# Patient Record
Sex: MALE | Race: BLACK OR AFRICAN AMERICAN | NOT HISPANIC OR LATINO | Employment: FULL TIME | URBAN - METROPOLITAN AREA
[De-identification: names, ages, dates, MRNs, and addresses within clinical notes are randomized per-mention and may not be internally consistent; named-entity substitution may affect disease eponyms.]

---

## 2017-04-26 ENCOUNTER — ALLSCRIPTS OFFICE VISIT (OUTPATIENT)
Dept: OTHER | Facility: OTHER | Age: 27
End: 2017-04-26

## 2017-04-26 DIAGNOSIS — M25.512 PAIN IN LEFT SHOULDER: ICD-10-CM

## 2017-10-09 ENCOUNTER — ALLSCRIPTS OFFICE VISIT (OUTPATIENT)
Dept: OTHER | Facility: OTHER | Age: 27
End: 2017-10-09

## 2017-10-09 DIAGNOSIS — M25.512 PAIN IN LEFT SHOULDER: ICD-10-CM

## 2017-10-10 NOTE — PROGRESS NOTES
Assessment  1  Pain in joint of left shoulder (719 41) (M25 512)    Plan  Pain in joint of left shoulder    · * XR SHOULDER 2+ VIEW LEFT; Status:Active; Requested [id number];    · 1 - Terell VILLASENOR, Darcy Gonzalez  (Orthopedic Surgery) Co-Management  *33 yo male, very active  at the gym, with ongoing left posterior shoulder pain  +_Crepitus on exam   Thanks  Caden Holder, DO  Status: Active  Requested for: [id number]  Care Summary provided  : Yes   · Follow-up PRN Evaluation and Treatment  Follow-up  Status: Complete  Done:  [id number]    Discussion/Summary  Discussion Summary:   Virginia Ruboi is stable on exam  He is to f/u PRN  left shoulder pain / strain - etiology of crepitus not clear; RTC appears intact  discussed modification of his exercise  again ordered, and he was referred to Ortho  Counseling Documentation With Imm: The patient was counseled regarding instructions for management,-risk factor reductions,-impressions,-importance of compliance with treatment  Medication SE Review and Pt Understands Tx: The treatment plan was reviewed with the patient/guardian  The patient/guardian understands and agrees with the treatment plan      Chief Complaint  Chief Complaint Free Text Note Form: Left shoulder pain pain for a couple months pain 2/10  had gotten better, taking it lighter at the gym - never got xrays done  more in the posterior shoulder now  History of Present Illness  HPI: As above  Review of Systems  Complete-Male:   Constitutional: as noted in HPI  Musculoskeletal: as noted in HPI  Active Problems  1  Acute pain of left shoulder (719 41) (M25 512)   2  Pain in joint of left shoulder (719 41) (M25 512)    Past Medical History  Active Problems And Past Medical History Reviewed: The active problems and past medical history were reviewed and updated today  Surgical History  1  History of Hernia Repair   2  History of Oral Surgery Tooth Extraction [place] Tooth  Surgical History Reviewed:    The surgical history was reviewed and updated today  Family History  Mother    1  No pertinent family history  Father    2  No pertinent family history    Social History   · Never a smoker   · Social alcohol use (Z78 9)    Current Meds   1  No Reported Medications Recorded    Allergies  1  MMR    Vitals  Vital Signs    Recorded: 94HGO7551 11:20AM   Heart Rate 60   Respiration 12   Systolic 987   Diastolic 84   Weight 414 lb 6 oz   BMI Calculated 24 39   BSA Calculated 2 01     Physical Exam    Constitutional   General appearance: No acute distress, well appearing and well nourished  -NAD; VSS; pleasant  Musculoskeletal   Gait and station: Normal     Inspection/palpation of joints, bones, and muscles: Normal  -Overall good ROm of the left shoulder; RTC appears intact on exam  +Crepitus noted with ROM exercises     Psychiatric   Orientation to person, place and time: Normal     Mood and affect: Normal          Signatures   Electronically signed by : Jamie Pitts DO; Oct  9 2017 11:42AM EST                       (Author)

## 2017-11-19 ENCOUNTER — APPOINTMENT (OUTPATIENT)
Dept: RADIOLOGY | Age: 27
End: 2017-11-19
Payer: COMMERCIAL

## 2017-11-19 ENCOUNTER — TRANSCRIBE ORDERS (OUTPATIENT)
Dept: ADMINISTRATIVE | Age: 27
End: 2017-11-19

## 2017-11-19 DIAGNOSIS — M25.512 PAIN IN LEFT SHOULDER: ICD-10-CM

## 2017-11-19 PROCEDURE — 73030 X-RAY EXAM OF SHOULDER: CPT

## 2017-11-24 ENCOUNTER — GENERIC CONVERSION - ENCOUNTER (OUTPATIENT)
Dept: OTHER | Facility: OTHER | Age: 27
End: 2017-11-24

## 2017-12-20 ENCOUNTER — GENERIC CONVERSION - ENCOUNTER (OUTPATIENT)
Dept: OTHER | Facility: OTHER | Age: 27
End: 2017-12-20

## 2017-12-20 DIAGNOSIS — M25.512 PAIN IN LEFT SHOULDER: ICD-10-CM

## 2017-12-20 DIAGNOSIS — M79.641 PAIN OF RIGHT HAND: ICD-10-CM

## 2018-01-02 ENCOUNTER — APPOINTMENT (OUTPATIENT)
Dept: PHYSICAL THERAPY | Facility: OTHER | Age: 28
End: 2018-01-02
Payer: COMMERCIAL

## 2018-01-02 DIAGNOSIS — M25.512 PAIN IN LEFT SHOULDER: ICD-10-CM

## 2018-01-02 PROCEDURE — 97161 PT EVAL LOW COMPLEX 20 MIN: CPT

## 2018-01-02 PROCEDURE — G8985 CARRY GOAL STATUS: HCPCS

## 2018-01-02 PROCEDURE — G8984 CARRY CURRENT STATUS: HCPCS

## 2018-01-04 ENCOUNTER — APPOINTMENT (OUTPATIENT)
Dept: PHYSICAL THERAPY | Facility: OTHER | Age: 28
End: 2018-01-04
Payer: COMMERCIAL

## 2018-01-04 PROCEDURE — 97112 NEUROMUSCULAR REEDUCATION: CPT

## 2018-01-04 PROCEDURE — 97140 MANUAL THERAPY 1/> REGIONS: CPT

## 2018-01-04 PROCEDURE — 97110 THERAPEUTIC EXERCISES: CPT

## 2018-01-08 ENCOUNTER — APPOINTMENT (OUTPATIENT)
Dept: PHYSICAL THERAPY | Facility: OTHER | Age: 28
End: 2018-01-08
Payer: COMMERCIAL

## 2018-01-08 PROCEDURE — 97112 NEUROMUSCULAR REEDUCATION: CPT

## 2018-01-08 PROCEDURE — 97110 THERAPEUTIC EXERCISES: CPT

## 2018-01-08 PROCEDURE — 97140 MANUAL THERAPY 1/> REGIONS: CPT

## 2018-01-11 ENCOUNTER — APPOINTMENT (OUTPATIENT)
Dept: PHYSICAL THERAPY | Facility: OTHER | Age: 28
End: 2018-01-11
Payer: COMMERCIAL

## 2018-01-11 PROCEDURE — 97110 THERAPEUTIC EXERCISES: CPT

## 2018-01-11 PROCEDURE — 97140 MANUAL THERAPY 1/> REGIONS: CPT

## 2018-01-11 PROCEDURE — 97112 NEUROMUSCULAR REEDUCATION: CPT

## 2018-01-13 VITALS
DIASTOLIC BLOOD PRESSURE: 80 MMHG | SYSTOLIC BLOOD PRESSURE: 110 MMHG | WEIGHT: 165.13 LBS | BODY MASS INDEX: 23.12 KG/M2 | RESPIRATION RATE: 16 BRPM | HEIGHT: 71 IN | HEART RATE: 72 BPM

## 2018-01-13 NOTE — RESULT NOTES
Verified Results  * XR SHOULDER 2+ VIEW LEFT [id number] 02:50PM Warner Fuentes Order Number: [id number]   Performing Comments: 31 yo male, very active at the gym, with ongoing left posterior shoulder pain  +_Crepitus on exam   Thanks  Giorgio Junior, DO     Test Name Result Flag Reference   XR SHOULDER 2+ VW LEFT (Report)     LEFT SHOULDER     INDICATION: 78-year-old male, left shoulder pain   COMPARISON: None     VIEWS: 3     IMAGES: 3     FINDINGS:     There is no acute fracture or dislocation  No degenerative changes  No lytic or blastic lesions are seen  Soft tissues are unremarkable  IMPRESSION:     No acute osseous abnormality         Workstation performed: [id number]     Signed by:   Carrington Claudio MD   11/24/17

## 2018-01-14 VITALS
SYSTOLIC BLOOD PRESSURE: 118 MMHG | HEART RATE: 60 BPM | WEIGHT: 176.38 LBS | BODY MASS INDEX: 24.39 KG/M2 | RESPIRATION RATE: 12 BRPM | DIASTOLIC BLOOD PRESSURE: 84 MMHG

## 2018-01-15 ENCOUNTER — APPOINTMENT (OUTPATIENT)
Dept: PHYSICAL THERAPY | Facility: OTHER | Age: 28
End: 2018-01-15
Payer: COMMERCIAL

## 2018-01-15 PROCEDURE — 97140 MANUAL THERAPY 1/> REGIONS: CPT

## 2018-01-15 PROCEDURE — 97110 THERAPEUTIC EXERCISES: CPT

## 2018-01-15 PROCEDURE — 97112 NEUROMUSCULAR REEDUCATION: CPT

## 2018-01-16 ENCOUNTER — ALLSCRIPTS OFFICE VISIT (OUTPATIENT)
Dept: OTHER | Facility: OTHER | Age: 28
End: 2018-01-16

## 2018-01-17 NOTE — PROGRESS NOTES
Assessment   1  Hand pain, right (729 5) (D06 259)    Plan   Hand pain, right    · * XR HAND 3+ VIEW RIGHT; Status:Active; Requested [id number];    · Follow-up PRN Evaluation and Treatment  Follow-up  Status: Complete  Done:    [id number]    Discussion/Summary      Clark is stable on exam  He is to f/u or call PRN no improvement (at that point, I would locally refer to [facility])  more here a soft tissue injury / possibly mild nerve palsy in the right hand, with the recent event  I suspect that this will resolve with a little time  Xrays were ordered as a precaution  can take OTC NSAIDs with food PRN, can perform warm hand soaks, can use a stress ball / moisés / [employer]-Tawana to work on ROM and  strength, and is to try lifting with this hand for the next 1 - 2 weeks  The patient was counseled regarding instructions for management,-- risk factor reductions,-- impressions,-- importance of compliance with treatment  The treatment plan was reviewed with the patient/guardian  The patient/guardian understands and agrees with the treatment plan      Chief Complaint   PT is being seen today due to having numbness and puffiness in right hand between the middle and ring finger  1 week ago, he lightly punched down with the right hand to break packing tape on a box he was taping up -> the tape twisted, and pulled on the soft tissue between the 3rd and 4th digits, causing pain that shot up into his forearm  History of Present Illness   HPI: As above  Review of Systems        Constitutional: as noted in HPI  Musculoskeletal: as noted in HPI  Active Problems   1  Acute pain of left shoulder (719 41) (M25 512)   2  Pain in joint of left shoulder (719 41) (M23 512)    Past Medical History   Active Problems And Past Medical History Reviewed: The active problems and past medical history were reviewed and updated today  Family History   Mother    1   No pertinent family history  Father 2  No pertinent family history    Social History    · Never a smoker   · Social alcohol use (Z78 9)    Surgical History   1  History of Hernia Repair   2  History of Oral Surgery Tooth Extraction [place] Tooth    Current Meds    1  No Reported Medications Recorded     The medication list was reviewed and updated today  Allergies   1  MMR    Vitals    Recorded: 06FAX9326 04:55PM   Heart Rate 76   Respiration 12   Systolic 656   Diastolic 78   Height 5 ft 11 in   Weight 175 lb 6 oz   BMI Calculated 24 46   BSA Calculated 1 99     Physical Exam        Constitutional      General appearance: No acute distress, well appearing and well nourished  -- NAD; VSS; pleasant  Musculoskeletal      Gait and station: Normal        Inspection/palpation of joints, bones, and muscles: Normal  -- Right Hand: Pt with mild TTP and slight puffiness in the soft tissue between the MCP joints of the 3rd and fourth digits; no erythema, fluctuance, crepitus; normal hand  strength, and flexor / extensor tendons appear intact; cap refill in the fingers and sensation in the fingers are normal       Psychiatric      Orientation to person, place and time: Normal        Mood and affect: Normal           Future Appointments      Date/Time Provider Specialty Site   01/31/2018 10:10 AM ÁNGELA Kidd   Orthopedic Surgery [facility]     Signatures    Electronically signed by : Luis Alexander DO; Jan 16 2018  5:26PM EST                       (Author)

## 2018-01-18 ENCOUNTER — APPOINTMENT (OUTPATIENT)
Dept: PHYSICAL THERAPY | Facility: OTHER | Age: 28
End: 2018-01-18
Payer: COMMERCIAL

## 2018-01-18 PROCEDURE — 97530 THERAPEUTIC ACTIVITIES: CPT

## 2018-01-18 PROCEDURE — 97112 NEUROMUSCULAR REEDUCATION: CPT

## 2018-01-18 PROCEDURE — 97140 MANUAL THERAPY 1/> REGIONS: CPT

## 2018-01-22 ENCOUNTER — APPOINTMENT (OUTPATIENT)
Dept: PHYSICAL THERAPY | Facility: OTHER | Age: 28
End: 2018-01-22
Payer: COMMERCIAL

## 2018-01-22 PROCEDURE — 97112 NEUROMUSCULAR REEDUCATION: CPT

## 2018-01-22 PROCEDURE — 97140 MANUAL THERAPY 1/> REGIONS: CPT

## 2018-01-22 PROCEDURE — 97110 THERAPEUTIC EXERCISES: CPT

## 2018-01-23 VITALS
SYSTOLIC BLOOD PRESSURE: 112 MMHG | HEIGHT: 71 IN | BODY MASS INDEX: 24.55 KG/M2 | HEART RATE: 76 BPM | RESPIRATION RATE: 12 BRPM | WEIGHT: 175.38 LBS | DIASTOLIC BLOOD PRESSURE: 78 MMHG

## 2018-01-24 VITALS
SYSTOLIC BLOOD PRESSURE: 134 MMHG | HEART RATE: 73 BPM | HEIGHT: 71 IN | DIASTOLIC BLOOD PRESSURE: 85 MMHG | BODY MASS INDEX: 24.99 KG/M2 | WEIGHT: 178.5 LBS

## 2018-01-25 ENCOUNTER — OFFICE VISIT (OUTPATIENT)
Dept: PHYSICAL THERAPY | Facility: OTHER | Age: 28
End: 2018-01-25
Payer: COMMERCIAL

## 2018-01-25 DIAGNOSIS — M25.512 ACUTE PAIN OF LEFT SHOULDER: Primary | ICD-10-CM

## 2018-01-25 PROCEDURE — 97110 THERAPEUTIC EXERCISES: CPT | Performed by: PEDIATRICS

## 2018-01-25 PROCEDURE — 97112 NEUROMUSCULAR REEDUCATION: CPT | Performed by: PEDIATRICS

## 2018-01-25 PROCEDURE — 97140 MANUAL THERAPY 1/> REGIONS: CPT | Performed by: PEDIATRICS

## 2018-01-25 NOTE — PROGRESS NOTES
Daily Note     Today's date: 2018  Patient name: Octavio Taveras  : 1990  MRN: [id number]  Referring provider: Yandy Santos MD  Dx:   Encounter Diagnosis   Name Primary?  Acute pain of left shoulder Yes                  Subjective: Pt  Reports he had slight muscle soreness after last treatment session  States he is noticing overall improvements  Objective: See treatment diary below  Precautions: None    Daily Treatment Diary     Manual              PROM L shoulder 10'                                                                    Exercise Diary              Adan LPD 20#  3x10            Adan row 25#  3x10            [place] IR/ER 9#  3x10 ea            Adan chops 20#  3x10            Pickens rops alternating 30" x 2            Pickens ropes slam 30" x 2            TB wall box OTB  7x            UBE 5'/5            Body blade                                                                                                                                                                Modalities                                                                 Assessment: Tolerated treatment well  Patient exhibited good technqiue with therapeutic exercises Added Rope slams with good tolerance      Plan: Continue per plan of care  and Progress treatment as tolerated

## 2018-01-29 ENCOUNTER — OFFICE VISIT (OUTPATIENT)
Dept: PHYSICAL THERAPY | Facility: OTHER | Age: 28
End: 2018-01-29
Payer: COMMERCIAL

## 2018-01-29 DIAGNOSIS — M25.512 ACUTE PAIN OF LEFT SHOULDER: Primary | ICD-10-CM

## 2018-01-29 PROCEDURE — 97112 NEUROMUSCULAR REEDUCATION: CPT | Performed by: PEDIATRICS

## 2018-01-29 PROCEDURE — 97110 THERAPEUTIC EXERCISES: CPT | Performed by: PEDIATRICS

## 2018-01-29 PROCEDURE — 97140 MANUAL THERAPY 1/> REGIONS: CPT | Performed by: PEDIATRICS

## 2018-01-29 NOTE — PROGRESS NOTES
Daily Note     Today's date: 2018  Patient name: Rocio Faustin  : 1990  MRN: [id number]  Referring provider: Aníbal Parra MD  Dx:   Encounter Diagnosis   Name Primary?  Acute pain of left shoulder Yes        IEP 5:00 - 5:20  1:1 with a PTA 5:20 - 6:00          Subjective: Pt  Reports he feels the same as last visit  Pt  Reports decreased pain with some lifting activities and improved body awareness during lifting  Objective: See treatment diary below  Precautions: None     Daily Treatment Diary      Manual                     PROM L shoulder 10'  8'                                                                                                                         Exercise Diary                        Adan LPD 20#  3x10  25#  3 x 10                   Adan row 25#  3x10  35#  3 x 10                   [place] IR/ER 9#  3x10 ea  IR 12#  3 x 10  ER 10#  3 x 10                   Adan chops 20#  3x10  22#  3 x 10                   Pickens rops alternating 30" x 2  30" x 2                   Pickens ropes slam 30" x 2  30" x 2                   TB wall box OTB  7x  OTB  7x                   UBE 5'/5  5'/5'                   Body blade    30" x 2  AP/ML                                                                                                                                                                                                                                                                                                 Modalities                                                                                                        Assessment: Tolerated treatment well  Patient demonstrated fatigue post treatment and could benefit from continued PT Burning muscle fatigue noted throughout treatment session, however, no sharp localized pain noted  Good technique with all exercises  Able to increase resistance today  Also added body blade  Pt   Was challenged with this exercise but did not complain of pain  Plan: Progress treatment as tolerated

## 2018-01-31 VITALS
SYSTOLIC BLOOD PRESSURE: 118 MMHG | HEART RATE: 76 BPM | WEIGHT: 172 LBS | DIASTOLIC BLOOD PRESSURE: 73 MMHG | HEIGHT: 72 IN | BODY MASS INDEX: 23.3 KG/M2

## 2018-01-31 DIAGNOSIS — M25.512 ACUTE PAIN OF LEFT SHOULDER: Primary | ICD-10-CM

## 2018-01-31 PROCEDURE — 99213 OFFICE O/P EST LOW 20 MIN: CPT | Performed by: ORTHOPAEDIC SURGERY

## 2018-01-31 NOTE — PROGRESS NOTES
Kolby Gordillo  (89 y o  male)  : 1990  MRN: [id number]  Encounter Date: 2018    Chief Complaint   Patient presents with    Left Shoulder - Pain       Visit Diagnosis / Orders  1  Acute pain of left shoulder         Assessment / Plan  Left shoulder posterior instability and scapular dysfunction    · Continue PT and transition to HEP  · Anti-inflammatories and ice prn  · Return if symptoms worsen or fail to improve  History of Present Illness  Kolby Gordillo is a 32 y o  male who presents to F/U left shoulder posterior instability, present for about 7 months  He has done about 6 weeks of formal PT and notes near complete resolution of symptoms  He does still have some aching and occasional clicking  Denies paresthesias  Review of Systems  Pertinent items are noted in HPI  All other systems were reviewed and are negative  Physical Exam  /73   Pulse 76   Ht 6' (1 829 m)   Wt 78 kg (172 lb)   BMI 23 33 kg/m²   Cons: Appears well  No apparent distress  Psych: Alert  Oriented x3  Mood and affect normal   Eyes: PERRLA, EOMI  Resp: Normal effort  No audible wheezing or stridor  CV: Palpable pulse  No discernable arrhythmia  No LE edema  Lymph:  No palpable cervical, axillary, or inguinal lymphadenopathy  Skin: Warm  No palpable masses  No visible lesions  Neuro: Normal and symmetric DTR's  Normal muscle tone  Left Shoulder Exam  Alignment / Posture:  Mild scapular protraction  Mild scapular dyskinesis  Inspection:  No swelling  No muscle atrophy  Palpation:  No tenderness  Mild clicking with shouder rotation  ROM:  Shoulder   Shoulder ER 80  Shoulder IR T6   Strength:  Supraspinatus 5/5  Infraspinatus 5/5  Subscapularis 5/5  Stability:  (-) Jerk test   Tests: (-) Montemayor  (-) Speed  (-) Noble  Neurovascular:  Sensation intact in Ax/R/M/U nerve distributions  2+ radial pulse        Studies Reviewed  No studies to review    Procedures  None today     Medical, Surgical, Family, and Social History  The patient's medical history, family history, and social history, were reviewed and updated as appropriate  History reviewed  No pertinent past medical history  Past Surgical History:   Procedure Laterality Date    HERNIA REPAIR         Family History   Problem Relation Age of Onset    No Known Problems Mother     No Known Problems Father        Social History     Occupational History    Not on file  Social History Main Topics    Smoking status: Light Tobacco Smoker    Smokeless tobacco: Current User    Alcohol use Yes    Drug use: Unknown    Sexual activity: Not on file       No Known Allergies    No current outpatient prescriptions on file        Saul Marrero MD

## 2018-04-21 NOTE — PROGRESS NOTES
Patient was supposed to return for a RE but he did not come back  Patient responded favorably to physical therapy  At this time, he has been discharged in [facility]

## 2024-01-21 ENCOUNTER — APPOINTMENT (EMERGENCY)
Dept: RADIOLOGY | Facility: HOSPITAL | Age: 34
End: 2024-01-21
Payer: COMMERCIAL

## 2024-01-21 ENCOUNTER — HOSPITAL ENCOUNTER (EMERGENCY)
Facility: HOSPITAL | Age: 34
Discharge: HOME | End: 2024-01-21
Attending: EMERGENCY MEDICINE
Payer: COMMERCIAL

## 2024-01-21 VITALS
BODY MASS INDEX: 23.03 KG/M2 | OXYGEN SATURATION: 100 % | HEIGHT: 72 IN | SYSTOLIC BLOOD PRESSURE: 131 MMHG | TEMPERATURE: 97.9 F | HEART RATE: 85 BPM | DIASTOLIC BLOOD PRESSURE: 83 MMHG | WEIGHT: 170 LBS | RESPIRATION RATE: 18 BRPM

## 2024-01-21 DIAGNOSIS — S52.521A CLOSED TORUS FRACTURE OF DISTAL END OF RIGHT RADIUS, INITIAL ENCOUNTER: Primary | ICD-10-CM

## 2024-01-21 PROCEDURE — 73070 X-RAY EXAM OF ELBOW: CPT

## 2024-01-21 PROCEDURE — 29125 APPL SHORT ARM SPLINT STATIC: CPT | Performed by: EMERGENCY MEDICINE

## 2024-01-21 PROCEDURE — 73110 X-RAY EXAM OF WRIST: CPT

## 2024-01-21 PROCEDURE — 99283 EMERGENCY DEPT VISIT LOW MDM: CPT

## 2024-01-21 PROCEDURE — 73090 X-RAY EXAM OF FOREARM: CPT

## 2024-01-21 PROCEDURE — 99284 EMERGENCY DEPT VISIT MOD MDM: CPT | Performed by: EMERGENCY MEDICINE

## 2024-01-21 PROCEDURE — 73130 X-RAY EXAM OF HAND: CPT

## 2024-01-21 RX ORDER — IBUPROFEN 600 MG/1
600 TABLET ORAL ONCE
Status: DISCONTINUED | OUTPATIENT
Start: 2024-01-21 | End: 2024-01-21 | Stop reason: HOSPADM

## 2024-01-21 NOTE — H&P (VIEW-ONLY)
History  Chief Complaint   Patient presents with    Wrist Injury     Right wrist injury after snowboarding. He hear a pop. Splinted by facility      Mr. Conn is a 33-year-old male with a past medical history of hernia repair when he was a child who presents to the ED after snowboarding accident.  Patient reports that he was on his snowboard when he caught the edge of his snowboard wrong and went up spinning around and falling onto his wrist.  Patient reports that his wrist hurts, and possibly his elbow.  He is unsure if this is an irritation of his typical elbow tendinitis or if this is a new injury.  Reports that he has not taken any pain medication, and he was wearing a helmet.  Reports that he did not lose consciousness, vomit, and remembers the entire event.        None       History reviewed. No pertinent past medical history.    Past Surgical History:   Procedure Laterality Date    HERNIA REPAIR         Family History   Problem Relation Age of Onset    No Known Problems Mother     No Known Problems Father      I have reviewed and agree with the history as documented.    E-Cigarette/Vaping    E-Cigarette Use Current Every Day User      E-Cigarette/Vaping Substances    Nicotine Yes      Social History     Tobacco Use    Smoking status: Light Smoker    Smokeless tobacco: Current   Vaping Use    Vaping status: Every Day    Substances: Nicotine   Substance Use Topics    Alcohol use: Yes        Review of Systems   Constitutional:  Negative for chills and fever.   Eyes:  Negative for pain and visual disturbance.   Respiratory:  Negative for chest tightness and shortness of breath.    Cardiovascular:  Negative for chest pain.   Gastrointestinal:  Negative for abdominal pain, nausea and vomiting.   Genitourinary:  Negative for dysuria and hematuria.   Skin:  Negative for rash and wound.   Neurological:  Negative for dizziness, syncope, light-headedness and headaches.   Psychiatric/Behavioral: Negative.          Physical Exam  ED Triage Vitals [01/21/24 1509]   Temperature Pulse Respirations Blood Pressure SpO2   97.9 °F (36.6 °C) 85 18 131/83 100 %      Temp Source Heart Rate Source Patient Position - Orthostatic VS BP Location FiO2 (%)   Oral Monitor Lying Left arm --      Pain Score       10 - Worst Possible Pain             Orthostatic Vital Signs  Vitals:    01/21/24 1509   BP: 131/83   Pulse: 85   Patient Position - Orthostatic VS: Lying       Physical Exam  Vitals and nursing note reviewed.   Constitutional:       Appearance: Normal appearance.   HENT:      Head: Normocephalic and atraumatic.      Comments: No external signs of trauma, patient is able to move his neck in a nomal ROM without pain.     Right Ear: External ear normal.      Left Ear: External ear normal.      Nose: Nose normal.      Mouth/Throat:      Mouth: Mucous membranes are moist.      Pharynx: Oropharynx is clear.   Eyes:      Extraocular Movements: Extraocular movements intact.      Conjunctiva/sclera: Conjunctivae normal.      Pupils: Pupils are equal, round, and reactive to light.   Cardiovascular:      Rate and Rhythm: Normal rate.   Pulmonary:      Effort: Pulmonary effort is normal.   Abdominal:      General: Abdomen is flat. There is no distension.      Tenderness: There is no abdominal tenderness. There is no guarding.   Musculoskeletal:         General: Normal range of motion.      Cervical back: Normal range of motion and neck supple.      Comments: Tenderness and swelling of the R wrist/forearm. Skin is cool but patient has been applying ice pack. Sensation, movement and bloodflow intact distal to the injury.   No pain at any other joint with palpation   Skin:     General: Skin is warm and dry.   Neurological:      General: No focal deficit present.      Mental Status: He is alert and oriented to person, place, and time.   Psychiatric:         Mood and Affect: Mood normal.         Behavior: Behavior normal.         ED  Medications  Medications   ibuprofen (MOTRIN) tablet 600 mg (600 mg Oral Not Given 1/21/24 1545)       Diagnostic Studies  Results Reviewed       None                   XR wrist 3+ views RIGHT    (Results Pending)   XR forearm 2 views RIGHT    (Results Pending)   XR hand 3+ views RIGHT    (Results Pending)   XR elbow 2 vw right    (Results Pending)   XR wrist 3+ views RIGHT    (Results Pending)         Procedures  Splint application    Date/Time: 1/21/2024 7:38 PM    Performed by: Sho Matthews MD  Authorized by: Sho Matthews MD  Universal Protocol:  Consent: Verbal consent obtained.  Consent given by: patient    Pre-procedure details:     Sensation:  Normal  Procedure details:     Laterality:  Right    Location:  Arm    Arm:  R lower arm    Strapping: no      Splint type:  Sugar tong    Supplies:  Cotton padding, elastic bandage and Ortho-Glass        ED Course                             SBIRT 20yo+      Flowsheet Row Most Recent Value   Initial Alcohol Screen: US AUDIT-C     1. How often do you have a drink containing alcohol? 0 Filed at: 01/21/2024 1509   2. How many drinks containing alcohol do you have on a typical day you are drinking?  0 Filed at: 01/21/2024 1509   3a. Male UNDER 65: How often do you have five or more drinks on one occasion? 0 Filed at: 01/21/2024 1509   Audit-C Score 0 Filed at: 01/21/2024 1509   [place]: How many times in the past year have you...    Used an illegal drug or used a prescription medication for non-medical reasons? Never Filed at: 01/21/2024 1509                  Medical Decision Making  Mr. Conn is a 33-year-old male with a past medical history of hernia repair when he was a child who presents to the ED after snowboarding accident.    Based on history and physical DDx includes but is not limited to: fracture vs sprain of the R wrist/forearm    Patient reports he doesn't typically take pain medication, prescribed motrin at patient request  XR as documented on ED  read.  Splint applied. Ortho referral provided.    Results shared with patient. Return precautions given and patient expresses understanding and is comfortable with discharge.      Amount and/or Complexity of Data Reviewed  Radiology: ordered.    Risk  Prescription drug management.          Disposition  Final diagnoses:   Closed torus fracture of distal end of right radius, initial encounter     Time reflects when diagnosis was documented in both MDM as applicable and the Disposition within this note       Time User Action Codes Description Comment    1/21/2024  4:17 PM Ant Chandra Add [S52.521A] Closed torus fracture of distal end of right radius, initial encounter     1/21/2024  4:49 PM Sho Matthews Add [S52.91XA] Closed right radial fracture     1/21/2024  4:49 PM Sho Matthews Remove [S52.91XA] Closed right radial fracture           ED Disposition       ED Disposition   Discharge    Condition   Stable    Date/Time   Sun Jan 21, 2024 1617    Comment   Candido Conn discharge to home/self care.                   Follow-up Information       Follow up With Specialties Details Why Contact Info Additional Information    [facility] [place] [facility] Schedule an appointment as soon as possible for a visit   [address] [address] [phone] [facility] [place], [facility] [address], [address], [place], [place], [address] [phone]            There are no discharge medications for this patient.        PDMP Review       None             ED Provider  Attending physically available and evaluated Epidanuta LEIGHA Conn. I managed the patient along with the ED Attending.    Electronically Signed by           Sho Matthews MD  01/21/24 8980

## 2024-01-21 NOTE — ED PROVIDER NOTES
History  Chief Complaint   Patient presents with    Wrist Injury     Right wrist injury after snowboarding. He hear a pop. Splinted by facility      Mr. Conn is a 33-year-old male with a past medical history of hernia repair when he was a child who presents to the ED after snowboarding accident.  Patient reports that he was on his snowboard when he caught the edge of his snowboard wrong and went up spinning around and falling onto his wrist.  Patient reports that his wrist hurts, and possibly his elbow.  He is unsure if this is an irritation of his typical elbow tendinitis or if this is a new injury.  Reports that he has not taken any pain medication, and he was wearing a helmet.  Reports that he did not lose consciousness, vomit, and remembers the entire event.        None       History reviewed. No pertinent past medical history.    Past Surgical History:   Procedure Laterality Date    HERNIA REPAIR         Family History   Problem Relation Age of Onset    No Known Problems Mother     No Known Problems Father      I have reviewed and agree with the history as documented.    E-Cigarette/Vaping    E-Cigarette Use Current Every Day User      E-Cigarette/Vaping Substances    Nicotine Yes      Social History     Tobacco Use    Smoking status: Light Smoker    Smokeless tobacco: Current   Vaping Use    Vaping status: Every Day    Substances: Nicotine   Substance Use Topics    Alcohol use: Yes        Review of Systems   Constitutional:  Negative for chills and fever.   Eyes:  Negative for pain and visual disturbance.   Respiratory:  Negative for chest tightness and shortness of breath.    Cardiovascular:  Negative for chest pain.   Gastrointestinal:  Negative for abdominal pain, nausea and vomiting.   Genitourinary:  Negative for dysuria and hematuria.   Skin:  Negative for rash and wound.   Neurological:  Negative for dizziness, syncope, light-headedness and headaches.   Psychiatric/Behavioral: Negative.          Physical Exam  ED Triage Vitals [01/21/24 1509]   Temperature Pulse Respirations Blood Pressure SpO2   97.9 °F (36.6 °C) 85 18 131/83 100 %      Temp Source Heart Rate Source Patient Position - Orthostatic VS BP Location FiO2 (%)   Oral Monitor Lying Left arm --      Pain Score       10 - Worst Possible Pain             Orthostatic Vital Signs  Vitals:    01/21/24 1509   BP: 131/83   Pulse: 85   Patient Position - Orthostatic VS: Lying       Physical Exam  Vitals and nursing note reviewed.   Constitutional:       Appearance: Normal appearance.   HENT:      Head: Normocephalic and atraumatic.      Comments: No external signs of trauma, patient is able to move his neck in a nomal ROM without pain.     Right Ear: External ear normal.      Left Ear: External ear normal.      Nose: Nose normal.      Mouth/Throat:      Mouth: Mucous membranes are moist.      Pharynx: Oropharynx is clear.   Eyes:      Extraocular Movements: Extraocular movements intact.      Conjunctiva/sclera: Conjunctivae normal.      Pupils: Pupils are equal, round, and reactive to light.   Cardiovascular:      Rate and Rhythm: Normal rate.   Pulmonary:      Effort: Pulmonary effort is normal.   Abdominal:      General: Abdomen is flat. There is no distension.      Tenderness: There is no abdominal tenderness. There is no guarding.   Musculoskeletal:         General: Normal range of motion.      Cervical back: Normal range of motion and neck supple.      Comments: Tenderness and swelling of the R wrist/forearm. Skin is cool but patient has been applying ice pack. Sensation, movement and bloodflow intact distal to the injury.   No pain at any other joint with palpation   Skin:     General: Skin is warm and dry.   Neurological:      General: No focal deficit present.      Mental Status: He is alert and oriented to person, place, and time.   Psychiatric:         Mood and Affect: Mood normal.         Behavior: Behavior normal.         ED  Medications  Medications   ibuprofen (MOTRIN) tablet 600 mg (600 mg Oral Not Given 1/21/24 1545)       Diagnostic Studies  Results Reviewed       None                   XR wrist 3+ views RIGHT    (Results Pending)   XR forearm 2 views RIGHT    (Results Pending)   XR hand 3+ views RIGHT    (Results Pending)   XR elbow 2 vw right    (Results Pending)   XR wrist 3+ views RIGHT    (Results Pending)         Procedures  Splint application    Date/Time: 1/21/2024 7:38 PM    Performed by: Sho Matthews MD  Authorized by: Sho Matthews MD  Universal Protocol:  Consent: Verbal consent obtained.  Consent given by: patient    Pre-procedure details:     Sensation:  Normal  Procedure details:     Laterality:  Right    Location:  Arm    Arm:  R lower arm    Strapping: no      Splint type:  Sugar tong    Supplies:  Cotton padding, elastic bandage and Ortho-Glass        ED Course                             SBIRT 22yo+      Flowsheet Row Most Recent Value   Initial Alcohol Screen: US AUDIT-C     1. How often do you have a drink containing alcohol? 0 Filed at: 01/21/2024 1509   2. How many drinks containing alcohol do you have on a typical day you are drinking?  0 Filed at: 01/21/2024 1509   3a. Male UNDER 65: How often do you have five or more drinks on one occasion? 0 Filed at: 01/21/2024 1509   Audit-C Score 0 Filed at: 01/21/2024 1509   [place]: How many times in the past year have you...    Used an illegal drug or used a prescription medication for non-medical reasons? Never Filed at: 01/21/2024 1509                  Medical Decision Making  Mr. Conn is a 33-year-old male with a past medical history of hernia repair when he was a child who presents to the ED after snowboarding accident.    Based on history and physical DDx includes but is not limited to: fracture vs sprain of the R wrist/forearm    Patient reports he doesn't typically take pain medication, prescribed motrin at patient request  XR as documented on ED  read.  Splint applied. Ortho referral provided.    Results shared with patient. Return precautions given and patient expresses understanding and is comfortable with discharge.      Amount and/or Complexity of Data Reviewed  Radiology: ordered.    Risk  Prescription drug management.          Disposition  Final diagnoses:   Closed torus fracture of distal end of right radius, initial encounter     Time reflects when diagnosis was documented in both MDM as applicable and the Disposition within this note       Time User Action Codes Description Comment    1/21/2024  4:17 PM Ant Chandra Add [S52.521A] Closed torus fracture of distal end of right radius, initial encounter     1/21/2024  4:49 PM Sho Matthews Add [S52.91XA] Closed right radial fracture     1/21/2024  4:49 PM Sho Matthews Remove [S52.91XA] Closed right radial fracture           ED Disposition       ED Disposition   Discharge    Condition   Stable    Date/Time   Sun Jan 21, 2024 1617    Comment   Candido Conn discharge to home/self care.                   Follow-up Information       Follow up With Specialties Details Why Contact Info Additional Information    [facility] [place] [facility] Schedule an appointment as soon as possible for a visit   [address] [address] [phone] [facility] [place], [facility] [address], [address] [facility], [place], [address] [phone]            There are no discharge medications for this patient.        PDMP Review       None             ED Provider  Attending physically available and evaluated Epidanuta LEIGHA Conn. I managed the patient along with the ED Attending.    Electronically Signed by           Sho Matthews MD  01/21/24 3102

## 2024-01-21 NOTE — ED ATTENDING ATTESTATION
"1/21/2024  I, Ant Chandra MD, saw and evaluated the patient. I have discussed the patient with the resident/non-physician practitioner and agree with the resident's/non-physician practitioner's findings, Plan of Care, and MDM as documented in the resident's/non-physician practitioner's note, except where noted. All available labs and Radiology studies were reviewed.  I was present for key portions of any procedure(s) performed by the resident/non-physician practitioner and I was immediately available to provide assistance.       At this point I agree with the current assessment done in the Emergency Department.  I have conducted an independent evaluation of this patient a history and physical is as follows:    This is a 33-year-old male patient without any significantly related past medical history presenting to the ED today for complaint of right wrist pain.  Patient states that he was snowboarding, put his hand down on the snow, and heard a \"snap\".  Patient denies loss of consciousness, denies any trauma to anywhere else in his body.  On exam he has a significant mount of swelling over the dorsum of his right hand with tenderness to palpation over the distal radius, distal ulna, as well as pain with movement of the right thumb.  Patient has intact sensation, cap refill in his 5 fingers of his right hand, and has good motor function as well.  His differential diagnosis includes: Wrist sprain versus fracture versus dislocation versus other.  Patient initially offered pain medication, due to suspected fracture but patient would only like to try some NSAIDs for his pain control.  Patient had x-rays showing evidence for distal radius fracture, without any other associated fractures.  He had a sugar-tong splint applied, and referred to Ortho as an outpatient.  Patient is right-hand dominant.  Post-splint x-rays show good alignment of his bones.  The management plan was discussed in detail with the patient at " bedside and all questions were answered. Strict ED return instructions were discussed at bedside. Prior to discharge, both verbal and written instructions were provided. We discussed the signs and symptoms that should prompt the patient to return to the ED. All questions were answered and the patient was comfortable with the plan of care and discharged home. The patient agrees to return to the Emergency Department for concerns and/or progression of illness.    ED Course         Critical Care Time  Procedures

## 2024-01-22 ENCOUNTER — OFFICE VISIT (OUTPATIENT)
Dept: OBGYN CLINIC | Facility: MEDICAL CENTER | Age: 34
End: 2024-01-22
Payer: COMMERCIAL

## 2024-01-22 VITALS
DIASTOLIC BLOOD PRESSURE: 79 MMHG | HEIGHT: 72 IN | SYSTOLIC BLOOD PRESSURE: 138 MMHG | WEIGHT: 177 LBS | BODY MASS INDEX: 23.98 KG/M2 | HEART RATE: 81 BPM

## 2024-01-22 DIAGNOSIS — S52.521A CLOSED TORUS FRACTURE OF DISTAL END OF RIGHT RADIUS, INITIAL ENCOUNTER: ICD-10-CM

## 2024-01-22 PROCEDURE — 99204 OFFICE O/P NEW MOD 45 MIN: CPT | Performed by: ORTHOPAEDIC SURGERY

## 2024-01-22 RX ORDER — CEFAZOLIN SODIUM 2 G/50ML
2000 SOLUTION INTRAVENOUS ONCE
Status: CANCELLED | OUTPATIENT
Start: 2024-01-23 | End: 2024-01-22

## 2024-01-22 RX ORDER — CHLORHEXIDINE GLUCONATE 4 G/100ML
SOLUTION TOPICAL DAILY PRN
Status: CANCELLED | OUTPATIENT
Start: 2024-01-22

## 2024-01-22 RX ORDER — CHLORHEXIDINE GLUCONATE ORAL RINSE 1.2 MG/ML
15 SOLUTION DENTAL ONCE
Status: CANCELLED | OUTPATIENT
Start: 2024-01-22 | End: 2024-01-22

## 2024-01-22 RX ORDER — TRANEXAMIC ACID 10 MG/ML
1000 INJECTION, SOLUTION INTRAVENOUS ONCE
Status: CANCELLED | OUTPATIENT
Start: 2024-01-23 | End: 2024-01-22

## 2024-01-22 NOTE — PROGRESS NOTES
[facility] [place]  [address]  [place] [address]       Candido Conn  [id number]  1990    ORTHOPAEDIC SURGERY OUTPATIENT NOTE  1/22/2024      HISTORY:  33 y.o. male  right wrist fracture sustained after a fall. Seen at [facility] ED where he underwent reduction and splinting. He has occasional tingling into ring finger but no absent sensation or excessive pain. LHD. Works in sales.      History reviewed. No pertinent past medical history.    Past Surgical History:   Procedure Laterality Date   • HERNIA REPAIR         Social History     Socioeconomic History   • Marital status:      Spouse name: Not on file   • Number of children: Not on file   • Years of education: Not on file   • Highest education level: Not on file   Occupational History   • Not on file   Tobacco Use   • Smoking status: Light Smoker   • Smokeless tobacco: Current   Vaping Use   • Vaping status: Every Day   • Substances: Nicotine   Substance and Sexual Activity   • Alcohol use: Yes   • Drug use: Not on file   • Sexual activity: Not on file   Other Topics Concern   • Not on file   Social History Narrative   • Not on file     Social Determinants of Health     Financial Resource Strain: Not on file   Food Insecurity: Not on file   Transportation Needs: Not on file   Physical Activity: Not on file   Stress: Not on file   Social Connections: Not on file   Intimate Partner Violence: Not on file   Housing Stability: Not on file       Family History   Problem Relation Age of Onset   • No Known Problems Mother    • No Known Problems Father         Patient's Medications    No medications on file       No Known Allergies     /79 (BP Location: Left arm, Patient Position: Sitting, Cuff Size: Standard)   Pulse 81   Ht 6' (1.829 m)   Wt 80.3 kg (177 lb)   BMI 24.01 kg/m²      REVIEW OF SYSTEMS:  Constitutional: Negative.    HEENT: Negative.    Respiratory: Negative.     Skin: Negative.    Neurological: Negative.    Psychiatric/Behavioral: Negative.  Musculoskeletal: Negative except for that mentioned in the HPI.    PHYSICAL EXAM:     /79 (BP Location: Left arm, Patient Position: Sitting, Cuff Size: Standard)   Pulse 81   Ht 6' (1.829 m)   Wt 80.3 kg (177 lb)   BMI 24.01 kg/m²   Gen: No acute distress, resting comfortably in bed  HEENT: Eyes clear, moist mucus membranes, hearing intact  Respiratory: No audible wheezing or stridor  Cardiovascular: Well Perfused peripherally, 2+ distal pulse  Abdomen: nondistended, no peritoneal signs    RIGHT wrist:     NVI axillary/medial/radial/ulnar and sensory intact     Skin intact with splint removed, swelling right wrist. No other tender areas noted     Normal elbow and shoulder motion    Cap refill intact, palpable radial pulse    IMAGING:  XR right wrist shows a comminuted intra-articular distal radius fracture with shortening of approximately 4 mm, 15 degrees posterior angulation, and loss of radial inclination.     ASSESSMENT AND PLAN: 33 y.o. male  comminuted intra-articular distal radius fracture with displacement into significant dorsal angulation and radial shortening. He would benefit from open reduction internal fixation in order to enable eraly mobilization and avoid malunion of distal radius.

## 2024-01-23 ENCOUNTER — ANESTHESIA (OUTPATIENT)
Dept: PERIOP | Facility: HOSPITAL | Age: 34
End: 2024-01-23
Payer: COMMERCIAL

## 2024-01-23 ENCOUNTER — APPOINTMENT (OUTPATIENT)
Dept: RADIOLOGY | Facility: HOSPITAL | Age: 34
End: 2024-01-23
Payer: COMMERCIAL

## 2024-01-23 ENCOUNTER — HOSPITAL ENCOUNTER (OUTPATIENT)
Facility: HOSPITAL | Age: 34
Setting detail: OUTPATIENT SURGERY
Discharge: HOME | End: 2024-01-23
Attending: ORTHOPAEDIC SURGERY | Admitting: ORTHOPAEDIC SURGERY
Payer: COMMERCIAL

## 2024-01-23 ENCOUNTER — ANESTHESIA EVENT (OUTPATIENT)
Dept: PERIOP | Facility: HOSPITAL | Age: 34
End: 2024-01-23
Payer: COMMERCIAL

## 2024-01-23 VITALS
WEIGHT: 171 LBS | RESPIRATION RATE: 16 BRPM | TEMPERATURE: 97.4 F | BODY MASS INDEX: 23.16 KG/M2 | DIASTOLIC BLOOD PRESSURE: 84 MMHG | HEIGHT: 72 IN | SYSTOLIC BLOOD PRESSURE: 139 MMHG | HEART RATE: 77 BPM | OXYGEN SATURATION: 94 %

## 2024-01-23 DIAGNOSIS — S62.101A CLOSED FRACTURE OF RIGHT WRIST, INITIAL ENCOUNTER: Primary | ICD-10-CM

## 2024-01-23 PROBLEM — S62.109A WRIST FRACTURE: Status: ACTIVE | Noted: 2024-01-23

## 2024-01-23 PROCEDURE — C1713 ANCHOR/SCREW BN/BN,TIS/BN: HCPCS | Performed by: ORTHOPAEDIC SURGERY

## 2024-01-23 PROCEDURE — 73100 X-RAY EXAM OF WRIST: CPT

## 2024-01-23 PROCEDURE — 25609 OPTX DST RD XART FX/EP SEP3+: CPT | Performed by: ORTHOPAEDIC SURGERY

## 2024-01-23 PROCEDURE — NC001 PR NO CHARGE: Performed by: PHYSICIAN ASSISTANT

## 2024-01-23 DEVICE — IMPLANTABLE DEVICE: Type: IMPLANTABLE DEVICE | Site: WRIST | Status: FUNCTIONAL

## 2024-01-23 DEVICE — SCREW COMP 2.4 X 18MM TI KREULOCK: Type: IMPLANTABLE DEVICE | Site: WRIST | Status: FUNCTIONAL

## 2024-01-23 RX ORDER — CEPHALEXIN 500 MG/1
500 CAPSULE ORAL EVERY 6 HOURS SCHEDULED
Qty: 8 CAPSULE | Refills: 0 | Status: SHIPPED | OUTPATIENT
Start: 2024-01-23 | End: 2024-01-25

## 2024-01-23 RX ORDER — FENTANYL CITRATE/PF 50 MCG/ML
25 SYRINGE (ML) INJECTION
Status: DISCONTINUED | OUTPATIENT
Start: 2024-01-23 | End: 2024-01-23 | Stop reason: HOSPADM

## 2024-01-23 RX ORDER — SODIUM CHLORIDE, SODIUM LACTATE, POTASSIUM CHLORIDE, CALCIUM CHLORIDE 600; 310; 30; 20 MG/100ML; MG/100ML; MG/100ML; MG/100ML
INJECTION, SOLUTION INTRAVENOUS CONTINUOUS PRN
Status: DISCONTINUED | OUTPATIENT
Start: 2024-01-23 | End: 2024-01-23

## 2024-01-23 RX ORDER — ONDANSETRON 2 MG/ML
INJECTION INTRAMUSCULAR; INTRAVENOUS AS NEEDED
Status: DISCONTINUED | OUTPATIENT
Start: 2024-01-23 | End: 2024-01-23

## 2024-01-23 RX ORDER — ALBUTEROL SULFATE 2.5 MG/3ML
2.5 SOLUTION RESPIRATORY (INHALATION) ONCE AS NEEDED
Status: DISCONTINUED | OUTPATIENT
Start: 2024-01-23 | End: 2024-01-23 | Stop reason: HOSPADM

## 2024-01-23 RX ORDER — MIDAZOLAM HYDROCHLORIDE 2 MG/2ML
INJECTION, SOLUTION INTRAMUSCULAR; INTRAVENOUS AS NEEDED
Status: DISCONTINUED | OUTPATIENT
Start: 2024-01-23 | End: 2024-01-23

## 2024-01-23 RX ORDER — DEXAMETHASONE SODIUM PHOSPHATE 10 MG/ML
INJECTION, SOLUTION INTRAMUSCULAR; INTRAVENOUS AS NEEDED
Status: DISCONTINUED | OUTPATIENT
Start: 2024-01-23 | End: 2024-01-23

## 2024-01-23 RX ORDER — CHLORHEXIDINE GLUCONATE 4 G/100ML
SOLUTION TOPICAL DAILY PRN
Status: DISCONTINUED | OUTPATIENT
Start: 2024-01-23 | End: 2024-01-23 | Stop reason: HOSPADM

## 2024-01-23 RX ORDER — FENTANYL CITRATE 50 UG/ML
INJECTION, SOLUTION INTRAMUSCULAR; INTRAVENOUS AS NEEDED
Status: DISCONTINUED | OUTPATIENT
Start: 2024-01-23 | End: 2024-01-23

## 2024-01-23 RX ORDER — CEFAZOLIN SODIUM 2 G/50ML
2000 SOLUTION INTRAVENOUS ONCE
Status: COMPLETED | OUTPATIENT
Start: 2024-01-23 | End: 2024-01-23

## 2024-01-23 RX ORDER — PROPOFOL 10 MG/ML
INJECTION, EMULSION INTRAVENOUS AS NEEDED
Status: DISCONTINUED | OUTPATIENT
Start: 2024-01-23 | End: 2024-01-23

## 2024-01-23 RX ORDER — TRANEXAMIC ACID 10 MG/ML
1000 INJECTION, SOLUTION INTRAVENOUS ONCE
Status: COMPLETED | OUTPATIENT
Start: 2024-01-23 | End: 2024-01-23

## 2024-01-23 RX ORDER — OXYCODONE HYDROCHLORIDE 5 MG/1
5 TABLET ORAL EVERY 4 HOURS PRN
Qty: 30 TABLET | Refills: 0 | Status: SHIPPED | OUTPATIENT
Start: 2024-01-23 | End: 2024-01-28

## 2024-01-23 RX ORDER — HYDROMORPHONE HCL/PF 1 MG/ML
0.5 SYRINGE (ML) INJECTION
Status: DISCONTINUED | OUTPATIENT
Start: 2024-01-23 | End: 2024-01-23 | Stop reason: HOSPADM

## 2024-01-23 RX ORDER — LIDOCAINE HYDROCHLORIDE 10 MG/ML
INJECTION, SOLUTION EPIDURAL; INFILTRATION; INTRACAUDAL; PERINEURAL AS NEEDED
Status: DISCONTINUED | OUTPATIENT
Start: 2024-01-23 | End: 2024-01-23

## 2024-01-23 RX ORDER — ROPIVACAINE HYDROCHLORIDE 5 MG/ML
INJECTION, SOLUTION EPIDURAL; INFILTRATION; PERINEURAL
Status: COMPLETED | OUTPATIENT
Start: 2024-01-23 | End: 2024-01-23

## 2024-01-23 RX ORDER — CHLORHEXIDINE GLUCONATE ORAL RINSE 1.2 MG/ML
15 SOLUTION DENTAL ONCE
Status: COMPLETED | OUTPATIENT
Start: 2024-01-23 | End: 2024-01-23

## 2024-01-23 RX ORDER — ONDANSETRON 2 MG/ML
4 INJECTION INTRAMUSCULAR; INTRAVENOUS ONCE AS NEEDED
Status: DISCONTINUED | OUTPATIENT
Start: 2024-01-23 | End: 2024-01-23 | Stop reason: HOSPADM

## 2024-01-23 RX ADMIN — ROPIVACAINE HYDROCHLORIDE 30 ML: 5 INJECTION, SOLUTION EPIDURAL; INFILTRATION; PERINEURAL at 09:56

## 2024-01-23 RX ADMIN — TRANEXAMIC ACID 1000 MG: 10 INJECTION, SOLUTION INTRAVENOUS at 10:14

## 2024-01-23 RX ADMIN — LIDOCAINE HYDROCHLORIDE 50 MG: 10 INJECTION, SOLUTION EPIDURAL; INFILTRATION; INTRACAUDAL at 10:07

## 2024-01-23 RX ADMIN — ONDANSETRON 4 MG: 2 INJECTION INTRAMUSCULAR; INTRAVENOUS at 10:07

## 2024-01-23 RX ADMIN — CHLORHEXIDINE GLUCONATE 0.12% ORAL RINSE 15 ML: 1.2 LIQUID ORAL at 09:04

## 2024-01-23 RX ADMIN — CEFAZOLIN SODIUM 2000 MG: 2 SOLUTION INTRAVENOUS at 10:12

## 2024-01-23 RX ADMIN — FENTANYL CITRATE 50 MCG: 50 INJECTION, SOLUTION INTRAMUSCULAR; INTRAVENOUS at 10:28

## 2024-01-23 RX ADMIN — MIDAZOLAM HYDROCHLORIDE 2 MG: 1 INJECTION, SOLUTION INTRAMUSCULAR; INTRAVENOUS at 09:39

## 2024-01-23 RX ADMIN — PROPOFOL 200 MG: 10 INJECTION, EMULSION INTRAVENOUS at 10:07

## 2024-01-23 RX ADMIN — SODIUM CHLORIDE, SODIUM LACTATE, POTASSIUM CHLORIDE, AND CALCIUM CHLORIDE: .6; .31; .03; .02 INJECTION, SOLUTION INTRAVENOUS at 11:12

## 2024-01-23 RX ADMIN — DEXAMETHASONE SODIUM PHOSPHATE 10 MG: 10 INJECTION INTRAMUSCULAR; INTRAVENOUS at 10:09

## 2024-01-23 RX ADMIN — SODIUM CHLORIDE, SODIUM LACTATE, POTASSIUM CHLORIDE, AND CALCIUM CHLORIDE: .6; .31; .03; .02 INJECTION, SOLUTION INTRAVENOUS at 09:42

## 2024-01-23 RX ADMIN — FENTANYL CITRATE 50 MCG: 50 INJECTION, SOLUTION INTRAMUSCULAR; INTRAVENOUS at 09:39

## 2024-01-23 NOTE — OP NOTE
OPERATIVE REPORT  PATIENT NAME: Candido Conn    :  1990  MRN: [id number]  Pt Location:  OR ROOM 02    SURGERY DATE: 2024    Surgeons and Role:     * Santos Talavera DO - Primary     * Aleksandr Swartz MD - Assisting     * Deon Keenan PA-C    Preop Diagnosis:  Closed fracture of distal end of right radius, initial encounter [S52.521A]    Post-Op Diagnosis Codes:     *Comminuted fracture right distal radius    Procedure(s):  Right - OPEN REDUCTION W/ INTERNAL FIXATION (ORIF) distal RADIUS     Specimen(s):  * No specimens in log *    Estimated Blood Loss:   Minimal    Drains:  * No LDAs found *    Anesthesia Type:   General w/ Regional    Operative Indications:  Comminuted distal radius fracture    Operative Findings:  Comminuted distal radius fracture with intra-articular extension involving more than 3 fragments    Complications:   None    Procedure and Technique:      INDICATIONS AND BRIEF HISTORY:      This is a 33 y.o. male who was diagnosed with a right distal radius fracture after reported injury.  The patient is indicated for open reduction internal fixation of the distal radius given the imaging findings.  The patient understood the risks and benefits of the procedure with risks including pain, stiffness, neurovascular injury, infection, fracture, malunion, nonunion, hardware complications, tendon rupture, complex regional pain syndrome, blood loss, blood clots, stroke, heart attack, all up to and including death.  The patient understood and did consent for surgery today.    DESCRIPTION OF PROCEDURE:      The patient was seen in the preoperative holding area where the operative extremity was marked.  The patient was taken to the operating room and placed in the supine position.  The operative extremity was prepped and draped in the usual sterile fashion.  A time-out was called and that patient was administered Ancef 2 g IV prior to incision.  Using a 15 blade knife a skin incision was made  using the modified volar Jhonatan approach to the distal radius.  Subcutaneous dissection was taken down to the FCR tendon.  This was mobilized and the FCR sub sheath was incised.  Finger dissection was then taken down to the pronator quadratus muscle.  Using a 15 blade knife this was incised in an L-shaped fashion.  It was reflected ulnarly to expose the distal radius fracture site.  The fracture site was evacuated of hematoma and blood clots using irrigation, curette, dental pick, and rongeur.  He was found that the fracture did extend intra-articularly to the distal radius ulnar joint and did involve more than 3 fragments.  Once the fracture ends were clear of fracture hematoma the distal radius fracture was manually reduced and provisionally fixated with a K-wire through the radial styloid.  Intraoperative fluoroscopy demonstrate near anatomic alignment of the distal radius fracture.  A distal radius volar locking plate was then placed and shown to be in good position on intraoperative fluoro.  Sequential drilling and placement of distal locking screws and shaft screws was then performed.  Intraoperative fluoro demonstrated adequate length of the screws and showed no signs of penetration into the radiocarpal joint.  The fracture site was near anatomic alignment.  The K-wire was removed and final intraoperative fluoroscopy demonstrated good positioning of the implant and the alignment of the fracture site.  The wound was then copiously irrigated.  The pronator quadratus was repaired with 2-0 Vicryl Monocryl.  The subcutaneous tissue was closed with 2-0 Monocryl.  The epidermis was closed with 3-0 nylon.  Dressings included Mepilex dressing, Webril, volar splint, and Ace bandage.  Patient tolerated the procedure well.  There were no complications throughout the case.  The patient was placed in PACU in stable condition.            Santos Talavera DO  1/23/2024  11:20 AM   I was present for the entire procedure. and A  physician assistant was required during the procedure for retraction, tissue handling, dissection and suturing.    Patient Disposition:  PACU         SIGNATURE: Santos Talavera DO  DATE: January 23, 2024  TIME: 11:17 AM

## 2024-01-23 NOTE — ANESTHESIA PREPROCEDURE EVALUATION
Procedure:  OPEN REDUCTION W/ INTERNAL FIXATION (ORIF) RADIUS / ULNA (WRIST)- right, all necessary procedures (Right: Wrist)    Relevant Problems   Musculoskeletal and Integument   (+) Wrist fracture             Anesthesia Plan  ASA Score- 1     Anesthesia Type- general with ASA Monitors.         Additional Monitors:     Airway Plan:     Comment: Axillary nerve block refused by patient preop.       Plan Factors-    Chart reviewed.                      Induction- intravenous.    Postoperative Plan-     Informed Consent- Anesthetic plan and risks discussed with patient.  I personally reviewed this patient with the CRNA. Discussed and agreed on the Anesthesia Plan with the CRNA..

## 2024-01-23 NOTE — DISCHARGE INSTR - AVS FIRST PAGE
Santos Talavera DO    Orthopedic Surgery, Shoulder/Elbow and Sports Medicine  [address], [place] [address]  Phone: [phone]    General Post-op Surgical Instructions:    Date of Procedure - 01/23/24     Procedure - Right distal radius ORIF    Weight Bearing Status - nonweightbearing right amr    DVT Prophylaxis and Duration - not required    PT/OT Instructions - to be discussed at first post-op    Stitches/Staples - Will be removed at 7-10 days postop; we will then place steristrips on incision site    Wound Care - maintain splint/keep clean and dry    Xray follow up - to be done at or prior to office visit follow-up if indicated    Additional Info - Please complete your course of antibiotics     Any questions or concerns please call [phone] please!

## 2024-01-23 NOTE — INTERVAL H&P NOTE
H&P reviewed. After examining the patient I find no changes in the patients condition since the H&P had been written.    Vitals:    01/23/24 0911   BP: 138/88   Pulse: 76   Resp: 12   Temp: 97.8 °F (36.6 °C)   SpO2: 99%     Right distal radius ORIF today

## 2024-01-23 NOTE — ANESTHESIA POSTPROCEDURE EVALUATION
Post-Op Assessment Note    CV Status:  Stable    Pain management: adequate       Mental Status:  Alert and awake   Hydration Status:  Euvolemic   PONV Controlled:  Controlled   Airway Patency:  Patent     Post Op Vitals Reviewed: Yes    No anethesia notable event occurred.    Staff: CRNA               BP   129/77   Temp 97.1   Pulse 93   Resp 14   SpO2 98

## 2024-01-23 NOTE — ANESTHESIA PROCEDURE NOTES
Peripheral Block    Patient location during procedure: holding area  Start time: 1/23/2024 9:46 AM  Reason for block: at surgeon's request and post-op pain management  Staffing  Performed by: Audie Ramirez DO  Authorized by: Audie Ramirez DO    Preanesthetic Checklist  Completed: patient identified, IV checked, site marked, risks and benefits discussed, surgical consent, monitors and equipment checked, pre-op evaluation and timeout performed  Peripheral Block  Patient position: supine  Prep: ChloraPrep  Patient monitoring: frequent blood pressure checks, continuous pulse oximetry and heart rate  Block type: Axillary  Laterality: right  Injection technique: single-shot  Procedures: ultrasound guided, Ultrasound guidance required for the procedure to increase accuracy and safety of medication placement and decrease risk of complications.  Ultrasound permanent image savedropivacaine (NAROPIN) 0.5 % injection 20 mL - Perineural   30 mL - 1/23/2024 9:56:00 AM  Needle  Needle type: Stimuplex   Needle length: 4 in  Needle localization: anatomical landmarks and ultrasound guidance  Assessment  Injection assessment: incremental injection, frequent aspiration, injected with ease, negative aspiration, negative for heart rate change, no paresthesia on injection, no symptoms of intraneural/intravenous injection and needle tip visualized at all times  Paresthesia pain: none  Post-procedure:  site cleaned  patient tolerated the procedure well with no immediate complications

## 2024-01-24 ENCOUNTER — TELEPHONE (OUTPATIENT)
Dept: OBGYN CLINIC | Facility: MEDICAL CENTER | Age: 34
End: 2024-01-24

## 2024-01-24 NOTE — TELEPHONE ENCOUNTER
----- Message from Deon Keenan PA-C sent at 1/23/2024 11:58 AM EST -----  Can we schedule Epidanuta's post op for 2/7 or 2/5 at [facility]? He has one for 1/31 but we used nylon sutures so they may not be ready to come out at 8 days.    Saqib

## 2024-02-05 ENCOUNTER — OFFICE VISIT (OUTPATIENT)
Dept: OBGYN CLINIC | Facility: MEDICAL CENTER | Age: 34
End: 2024-02-05

## 2024-02-05 ENCOUNTER — APPOINTMENT (OUTPATIENT)
Dept: RADIOLOGY | Facility: MEDICAL CENTER | Age: 34
End: 2024-02-05
Payer: COMMERCIAL

## 2024-02-05 VITALS — HEIGHT: 72 IN | BODY MASS INDEX: 23.49 KG/M2 | WEIGHT: 173.4 LBS

## 2024-02-05 DIAGNOSIS — Z98.890 STATUS POST SURGERY: Primary | ICD-10-CM

## 2024-02-05 DIAGNOSIS — Z98.890 STATUS POST SURGERY: ICD-10-CM

## 2024-02-05 PROCEDURE — 73110 X-RAY EXAM OF WRIST: CPT

## 2024-02-05 PROCEDURE — 99024 POSTOP FOLLOW-UP VISIT: CPT | Performed by: ORTHOPAEDIC SURGERY

## 2024-02-05 NOTE — PROGRESS NOTES
[facility] [place]  [address]  [place] [address]       Candido Conn  [id number]  1990    ORTHOPAEDIC SURGERY OUTPATIENT NOTE  2/5/2024      HISTORY:  33 y.o. male  is s/p right wrist open reduction with internal fixation due to distal radius fracture, DOS 1/23/24.  Patient is doing well.  He states at this time he has no pain.  He does state he is having tingling in his fingers.    History reviewed. No pertinent past medical history.    Past Surgical History:   Procedure Laterality Date    HERNIA REPAIR      KS OPTX DSTL RADL I-ARTIC FX/EPIPHYSL SEP 3 FRAG Right 1/23/2024    Procedure: OPEN REDUCTION W/ INTERNAL FIXATION (ORIF) RADIUS / ULNA (WRIST)- right, all necessary procedures;  Surgeon: Santos Talavera DO;  Location:  MAIN OR;  Service: Orthopedics       Social History     Socioeconomic History    Marital status:      Spouse name: Not on file    Number of children: Not on file    Years of education: Not on file    Highest education level: Not on file   Occupational History    Not on file   Tobacco Use    Smoking status: Light Smoker    Smokeless tobacco: Current   Vaping Use    Vaping status: Every Day    Start date: 1/1/2021    Substances: Nicotine   Substance and Sexual Activity    Alcohol use: Yes     Alcohol/week: 14.0 standard drinks of alcohol     Types: 14 Cans of beer per week    Drug use: Not Currently    Sexual activity: Not on file   Other Topics Concern    Not on file   Social History Narrative    Not on file     Social Determinants of Health     Financial Resource Strain: Not on file   Food Insecurity: Not on file   Transportation Needs: Not on file   Physical Activity: Not on file   Stress: Not on file   Social Connections: Not on file   Intimate Partner Violence: Not on file   Housing Stability: Not on file       Family History   Problem Relation Age of Onset    No Known Problems Mother     No Known Problems  Father         Patient's Medications    No medications on file       No Known Allergies     Ht 6' (1.829 m)   Wt 78.7 kg (173 lb 6.4 oz)   BMI 23.52 kg/m²      REVIEW OF SYSTEMS:  Constitutional: Negative.    HEENT: Negative.    Respiratory: Negative.    Skin: Negative.    Neurological: Negative.    Psychiatric/Behavioral: Negative.  Musculoskeletal: Negative except for that mentioned in the HPI.     PHYSICAL EXAM:    Right wrist   Sutures intact incision site  Surgical incision healing well, clean ,dry and intact   Able to move fingers, limited wrist movement secondary to stiffness     IMAGING:  X-ray right wrist demonstrates post-ORIF no evidence of hardware failure    ASSESSMENT AND PLAN:  33 y.o. male  s/p right wrist open reduction with internal fixation, DOS 1/23/24     XR right wrist was reviewed in the office today. Sutures were removed in the office today and steri strips were applied over the incision site. He may start to shower but do not submerge incision site in water or scrub the incision site.  Occupational Therapy order was given out today for range of motion exercises.  Patient is to continue nonweightbearing to the right upper extremity and cock up wrist place.  He is aware he is to wear the brace at all times except when showering, dressing and doing his exercises and occupational therapy and at home.  Encourage patient to do range of motion to the right elbow to prevent stiffness.  Patient is to follow-up in 4 weeks at that time we will repeat x-ray of the right wrist        Scribe Attestation      I,:  Ruy Rodriguez am acting as a scribe while in the presence of the attending physician.:       I,:  Santos Talavera DO personally performed the services described in this documentation    as scribed in my presence.:

## 2024-02-08 ENCOUNTER — EVALUATION (OUTPATIENT)
Dept: OCCUPATIONAL THERAPY | Age: 34
End: 2024-02-08
Payer: COMMERCIAL

## 2024-02-08 DIAGNOSIS — Z98.890 STATUS POST SURGERY: ICD-10-CM

## 2024-02-08 DIAGNOSIS — S62.101A CLOSED FRACTURE OF RIGHT WRIST, INITIAL ENCOUNTER: Primary | ICD-10-CM

## 2024-02-08 PROCEDURE — 97110 THERAPEUTIC EXERCISES: CPT | Performed by: OCCUPATIONAL THERAPIST

## 2024-02-08 PROCEDURE — 97165 OT EVAL LOW COMPLEX 30 MIN: CPT | Performed by: OCCUPATIONAL THERAPIST

## 2024-02-08 NOTE — PROGRESS NOTES
OT Evaluation     Today's date: 2024  Patient name: Candido Conn  : 1990  MRN: [id number]  Referring provider: Santos Talavera DO  Dx:   Encounter Diagnosis     ICD-10-CM    1. Closed fracture of right wrist, initial encounter  S62.101A                      Assessment  Assessment details: Candido is s/p R distal radius fx ORIF . He has s/s of ulnar nerve irritation/compression, swelling, decreased AROM, pain and post-surgical precautions affecting his  ADLs  and IADLs. Recommend continued tx to reach goals.  Impairments: activity intolerance and pain with function    Goals  STG) Motion increased by 25% in 4-6 weeks to facilitate feeding   STG) Strength/Motor skills improved by 25% in 4-6 weeks to facilitate simple meal prep  STG) Swelling\Edema improved by 25% in 4-6 weeks  to facilitate hygiene  STG) Pain decreased by 25% in 4-6 weeks to facilitate grooming    LTG) ADL and IADL skills improved   LTG) Work skills improved  LTG) Leisure skills improved    Patient Goals: To regain use of R hand       Goals, plan of care and treatment condition discussed with patient. Patient expresses their understanding and questions regarding these issues were addressed.      Plan  Patient would benefit from: OT eval and custom splinting  Planned modality interventions: TENS, thermotherapy: hydrocollator packs, thermotherapy: paraffin bath and ultrasound  Planned therapy interventions: neuromuscular re-education, motor coordination training, manual therapy, joint mobilization, Allison taping, strengthening, stretching, therapeutic activities, therapeutic exercise, functional ROM exercises, fine motor coordination training and orthotic fitting/training  Frequency: 2x week  Duration in visits: 10  Treatment plan discussed with: patient      Subjective Evaluation    History of Present Illness  Mechanism of injury:  R Distal radius ORIF. Fell while snowboarding.  Pain  Current pain ratin  At worst pain  rating: 3    Hand dominance: left          Objective     Active Range of Motion     Right Elbow   Forearm supination: 0 degrees   Forearm pronation: 50 degrees     Right Wrist   Wrist flexion: 18 degrees   Wrist extension: 40 degrees   Radial deviation: 10 degrees   Ulnar deviation: 10 degrees     Right Thumb   Flexion     MP: 50    DIP: 45  Palmar Abduction    CMC: 58  Radial Abduction    CMC: 48    Additional Active Range of Motion Details  .5 cm from DPC    Tests     Right Elbow   Positive Tinel's sign (cubital tunnel).     Additional Tests Details  SW 3.61 RF SF LF. 2.83 IF thumb    Swelling     Right Wrist/Hand   Circumference wrist: 19 cm             Precautions: 1/23 ORIF  HEP: FELECIA, Wrist AROM    Manuals             IASTM             FELECIA             MEM                          Neuro Re-Ed             Wrist maze             Wall walking             translation                                                                 Ther Ex             Gentle AAROM             Place + hold/Stepping                                                                                           Ther Activity             pinching             gripping             Gait Training                                       Modalities             [facility]

## 2024-02-12 ENCOUNTER — APPOINTMENT (OUTPATIENT)
Dept: OCCUPATIONAL THERAPY | Age: 34
End: 2024-02-12
Payer: COMMERCIAL

## 2024-02-12 ENCOUNTER — TELEPHONE (OUTPATIENT)
Age: 34
End: 2024-02-12

## 2024-02-12 DIAGNOSIS — Z98.890 STATUS POST SURGERY: ICD-10-CM

## 2024-02-12 DIAGNOSIS — S62.101A CLOSED FRACTURE OF RIGHT WRIST, INITIAL ENCOUNTER: Primary | ICD-10-CM

## 2024-02-12 NOTE — TELEPHONE ENCOUNTER
Caller: patient    Doctor: Ilan    Reason for call: Has 2 internal stitch popping out from surgery, wound is not open, patient would like to know what he should do   Please advise    Call back#: [phone]

## 2024-02-12 NOTE — PROGRESS NOTES
Daily Note     Today's date: 2024  Patient name: Candido Conn  : 1990  MRN: [id number]  Referring provider: Santos Talavera DO  Dx:   Encounter Diagnosis     ICD-10-CM    1. Closed fracture of right wrist, initial encounter  S62.101A       2. Status post surgery ORIF radius/ulna (wrist)  Z98.890                      Subjective: ***      Objective: See treatment diary below      Assessment: Tolerated treatment {Tolerated treatment :4814181767}. Patient {assessment:3911907346}      Plan: {PLAN:3278319463}     Precautions:  ORIF  HEP: FELECIA, Wrist AROM    Manuals             IASTM 5            FELECIA self            MEM                          Neuro Re-Ed             Wrist maze 10x            Wall walking tbx10            translation                                                                 Ther Ex             Gentle AAROM             Place + hold/Stepping                                                                                           Ther Activity             pinching             gripping             Gait Training                                       Modalities             P

## 2024-02-14 ENCOUNTER — OFFICE VISIT (OUTPATIENT)
Dept: OCCUPATIONAL THERAPY | Age: 34
End: 2024-02-14
Payer: COMMERCIAL

## 2024-02-14 DIAGNOSIS — S62.101A CLOSED FRACTURE OF RIGHT WRIST, INITIAL ENCOUNTER: Primary | ICD-10-CM

## 2024-02-14 PROCEDURE — 97112 NEUROMUSCULAR REEDUCATION: CPT | Performed by: OCCUPATIONAL THERAPIST

## 2024-02-14 PROCEDURE — 97110 THERAPEUTIC EXERCISES: CPT | Performed by: OCCUPATIONAL THERAPIST

## 2024-02-14 NOTE — PROGRESS NOTES
"Daily Note     Today's date: 2024  Patient name: Candido Conn  : 1990  MRN: [id number]  Referring provider: Santos Talavera DO  Dx:   Encounter Diagnosis     ICD-10-CM    1. Closed fracture of right wrist, initial encounter  S62.101A                      Subjective: \"It's sore\"      Objective: See treatment diary below      Assessment: Still sore along cubital tunnel and ulnar sided wrist. AROM grossly improved.    Plan: Continue per plan of care.      Precautions:  ORIF  HEP: FELECIA, Wrist AROM    Manuals             IASTM             FELECIA             MEM             KT Ulnar nerve            Neuro Re-Ed             Wrist maze 10x            Wall walking tbx10            translation 2x/time pennies                                                                Ther Ex             Gentle AAROM             Place + hold/Stepping             AROM Wrist/forearm                                                                             Ther Activity             pinching             gripping             Gait Training                                       Modalities             P 5                                "

## 2024-02-16 NOTE — TELEPHONE ENCOUNTER
Caller: Patient     Doctor: Ilan    Reason for call: Patient stated his physical therapist was able to trim down the stitches and it is healing nicely. He doesn't think he needs to come into the office.     Call back#: [phone]

## 2024-02-19 ENCOUNTER — OFFICE VISIT (OUTPATIENT)
Dept: OCCUPATIONAL THERAPY | Age: 34
End: 2024-02-19
Payer: COMMERCIAL

## 2024-02-19 DIAGNOSIS — S62.101A CLOSED FRACTURE OF RIGHT WRIST, INITIAL ENCOUNTER: Primary | ICD-10-CM

## 2024-02-19 DIAGNOSIS — Z98.890 STATUS POST SURGERY: ICD-10-CM

## 2024-02-19 PROCEDURE — 97112 NEUROMUSCULAR REEDUCATION: CPT | Performed by: OCCUPATIONAL THERAPIST

## 2024-02-19 PROCEDURE — 97110 THERAPEUTIC EXERCISES: CPT | Performed by: OCCUPATIONAL THERAPIST

## 2024-02-19 NOTE — PROGRESS NOTES
"Daily Note     Today's date: 2024  Patient name: Candido Conn  : 1990  MRN: [id number]  Referring provider: Santos Talavera DO  Dx:   Encounter Diagnosis     ICD-10-CM    1. Closed fracture of right wrist, initial encounter  S62.101A       2. Status post surgery ORIF radius/ulna (wrist)  Z98.890                      Subjective: \"It feels looser\"      Objective: See treatment diary below      Assessment: AROM grossly improved - some hypersensitivity at suture sites secondary to remnant fibers    Plan: Continue per plan of care.      Precautions:  ORIF  HEP: FELECIA, Wrist AROM    Manuals            IASTM             FELECIA             MEM             KT Ulnar nerve            Neuro Re-Ed             Wrist maze 10x 10x           Wall walking tbx10 Tb x 10           translation 2x/time pennies 4x/time pennies                                                               Ther Ex             Gentle AAROM  10           Place + hold/Stepping             AROM Wrist/forearm Wrist/FA                                                                            Ther Activity             pinching  Pinch [place] TT/Key YY           gripping             Gait Training                                       Modalities             [facility] 5 5                                 "

## 2024-02-21 ENCOUNTER — OFFICE VISIT (OUTPATIENT)
Dept: OCCUPATIONAL THERAPY | Age: 34
End: 2024-02-21
Payer: COMMERCIAL

## 2024-02-21 DIAGNOSIS — Z98.890 STATUS POST SURGERY: ICD-10-CM

## 2024-02-21 DIAGNOSIS — S62.101A CLOSED FRACTURE OF RIGHT WRIST, INITIAL ENCOUNTER: Primary | ICD-10-CM

## 2024-02-21 PROCEDURE — 97112 NEUROMUSCULAR REEDUCATION: CPT | Performed by: OCCUPATIONAL THERAPIST

## 2024-02-21 PROCEDURE — 97110 THERAPEUTIC EXERCISES: CPT | Performed by: OCCUPATIONAL THERAPIST

## 2024-02-21 NOTE — PROGRESS NOTES
"Daily Note     Today's date: 2024  Patient name: Candido Conn  : 1990  MRN: [id number]  Referring provider: Santos Talavera DO  Dx:   Encounter Diagnosis     ICD-10-CM    1. Closed fracture of right wrist, initial encounter  S62.101A       2. Status post surgery ORIF radius/ulna (wrist)  Z98.890                      Subjective: \"It feels better\"      Objective: See treatment diary below      Assessment: Tolerated well, AROM grossly improving      Plan: Continue per plan of care.      Precautions:  ORIF  HEP: FELECIA, Wrist AROM    Manuals           IASTM             FELECIA             MEM             KT Ulnar nerve            Neuro Re-Ed             Wrist maze 10x 10x 10x          Wall walking tbx10 Tb x 10 Tb x 10          translation 2x/time pennies 4x/time pennies 4x/time pennies          Ball bounce   2m x 1                                                 Ther Ex             Gentle AAROM  10 10          Place + hold/Stepping             AROM Wrist/forearm Wrist/FA Wrist and FA          Sup/pro     Dowel 3x 30s                                                              Ther Activity             pinching  Pinch [place] TT/Key YY Pinch [place] TT/Key YY          gripping   Pow, Cyl YPW 3x 10          Gait Training                                       Modalities             MHP 5 5                                   "

## 2024-02-26 ENCOUNTER — OFFICE VISIT (OUTPATIENT)
Dept: OCCUPATIONAL THERAPY | Age: 34
End: 2024-02-26
Payer: COMMERCIAL

## 2024-02-26 DIAGNOSIS — S62.101A CLOSED FRACTURE OF RIGHT WRIST, INITIAL ENCOUNTER: Primary | ICD-10-CM

## 2024-02-26 DIAGNOSIS — Z98.890 STATUS POST SURGERY: ICD-10-CM

## 2024-02-26 PROCEDURE — 97140 MANUAL THERAPY 1/> REGIONS: CPT | Performed by: OCCUPATIONAL THERAPIST

## 2024-02-26 PROCEDURE — 97110 THERAPEUTIC EXERCISES: CPT | Performed by: OCCUPATIONAL THERAPIST

## 2024-02-26 NOTE — PROGRESS NOTES
"Daily Note     Today's date: 2024  Patient name: Candido Conn  : 1990  MRN: [id number]  Referring provider: Santos Talavera DO  Dx:   Encounter Diagnosis     ICD-10-CM    1. Closed fracture of right wrist, initial encounter  S62.101A       2. Status post surgery ORIF radius/ulna (wrist)  Z98.890                      Subjective: \"It feels better\"      Objective: See treatment diary below      Assessment: Doing well, pain and AROM improving    Plan: Continue per plan of care.      Precautions:  ORIF  HEP: FELECIA, Wrist AROM    Manuals          IASTM    10         FELECIA             MEM             KT Ulnar nerve            Neuro Re-Ed             Wrist maze 10x 10x 10x 10x         Wall walking tbx10 Tb x 10 Tb x 10 tbx10         translation 2x/time pennies 4x/time pennies 4x/time pennies KP5x         Ball bounce   2m x 1 2m                                                Ther Ex             Gentle AAROM  10 10 10         Place + hold/Stepping             AROM Wrist/forearm Wrist/FA Wrist and FA Wrist/FA         Sup/pro     Dowel 3x 30s Dowel 3x 30s                                                             Ther Activity             pinching  Pinch [place] TT/Key YY Pinch [place] TT/Key YY Pinch [place] TT/Key YY         gripping   Pow, Cyl YPW 3x 10 Pow, Cyl YPW 3x 10         Gait Training                                       Modalities             MHP 5 5  5                                   "

## 2024-02-28 ENCOUNTER — OFFICE VISIT (OUTPATIENT)
Dept: OCCUPATIONAL THERAPY | Age: 34
End: 2024-02-28
Payer: COMMERCIAL

## 2024-02-28 DIAGNOSIS — S62.101A CLOSED FRACTURE OF RIGHT WRIST, INITIAL ENCOUNTER: Primary | ICD-10-CM

## 2024-02-28 DIAGNOSIS — Z98.890 STATUS POST SURGERY: ICD-10-CM

## 2024-02-28 PROCEDURE — 97110 THERAPEUTIC EXERCISES: CPT | Performed by: OCCUPATIONAL THERAPIST

## 2024-02-28 PROCEDURE — 97140 MANUAL THERAPY 1/> REGIONS: CPT | Performed by: OCCUPATIONAL THERAPIST

## 2024-02-28 NOTE — PROGRESS NOTES
"Daily Note     Today's date: 2024  Patient name: Candido Conn  : 1990  MRN: [id number]  Referring provider: Santos Talavera DO  Dx:   Encounter Diagnosis     ICD-10-CM    1. Closed fracture of right wrist, initial encounter  S62.101A       2. Status post surgery ORIF radius/ulna (wrist)  Z98.890                      Subjective: \"It feels pretty good\"      Objective: See treatment diary below      Assessment: Continued improvement with active range of motion, stiffness, and pain  Plan: Continue per plan of care.      Precautions:  ORIF  HEP: FELECIA, Wrist AROM    Manuals         IASTM    10 10        FELECIA             MEM             KT Ulnar nerve            Neuro Re-Ed             Wrist maze 10x 10x 10x 10x 10x        Wall walking tbx10 Tb x 10 Tb x 10 tbx10 10x        translation 2x/time pennies 4x/time pennies 4x/time pennies KP5x KPxk5        Ball bounce   2m x 1 2m 2m                                               Ther Ex             Gentle AAROM  10 10 10         Place + hold/Stepping             AROM Wrist/forearm Wrist/FA Wrist and FA Wrist/FA Wrist/FA        Sup/pro     Dowel 3x 30s Dowel 3x 30s Dowel 3x 30        WF/WE     1# 2x10                                               Ther Activity             pinching  Pinch [place] TT/Key YY Pinch [place] TT/Key YY Pinch [place] TT/Key YY Pinch [place] TT/Key RR        gripping   Pow, Cyl YPW 3x 10 Pow, Cyl YPW 3x 10 Pow, Cyl RPW 3x 10        Gait Training                                       Modalities             MHP 5 5  5 5                                    "

## 2024-03-04 ENCOUNTER — OFFICE VISIT (OUTPATIENT)
Dept: OBGYN CLINIC | Facility: MEDICAL CENTER | Age: 34
End: 2024-03-04

## 2024-03-04 ENCOUNTER — APPOINTMENT (OUTPATIENT)
Dept: RADIOLOGY | Facility: MEDICAL CENTER | Age: 34
End: 2024-03-04
Payer: COMMERCIAL

## 2024-03-04 ENCOUNTER — APPOINTMENT (OUTPATIENT)
Dept: OCCUPATIONAL THERAPY | Age: 34
End: 2024-03-04
Payer: COMMERCIAL

## 2024-03-04 VITALS
BODY MASS INDEX: 23.43 KG/M2 | HEART RATE: 89 BPM | HEIGHT: 72 IN | WEIGHT: 173 LBS | SYSTOLIC BLOOD PRESSURE: 147 MMHG | DIASTOLIC BLOOD PRESSURE: 97 MMHG

## 2024-03-04 DIAGNOSIS — S52.521A CLOSED TORUS FRACTURE OF DISTAL END OF RIGHT RADIUS, INITIAL ENCOUNTER: ICD-10-CM

## 2024-03-04 DIAGNOSIS — Z98.890 STATUS POST SURGERY: ICD-10-CM

## 2024-03-04 DIAGNOSIS — Z98.890 STATUS POST SURGERY: Primary | ICD-10-CM

## 2024-03-04 PROCEDURE — 99024 POSTOP FOLLOW-UP VISIT: CPT | Performed by: ORTHOPAEDIC SURGERY

## 2024-03-04 PROCEDURE — 73110 X-RAY EXAM OF WRIST: CPT

## 2024-03-04 NOTE — PROGRESS NOTES
[facility] [place]  [address]  [place] [address]       Candido Conn  [id number]  1990    ORTHOPAEDIC SURGERY OUTPATIENT NOTE  3/4/2024      HISTORY:  33 y.o. male presents for 6-week follow-up status post right distal radius open reduction internal fixation on 1/23/2024.  We last saw him he recommended hand therapy.  He has been attending therapy and doing well.  He denies numbness or tingling in the hand.  He did have some sutures showing but these were trimmed by his therapist.  The wound is healed nicely.    History reviewed. No pertinent past medical history.    Past Surgical History:   Procedure Laterality Date    HERNIA REPAIR      MD OPTX DSTL RADL I-ARTIC FX/EPIPHYSL SEP 3 FRAG Right 1/23/2024    Procedure: OPEN REDUCTION W/ INTERNAL FIXATION (ORIF) RADIUS / ULNA (WRIST)- right, all necessary procedures;  Surgeon: Santos Talavera DO;  Location:  MAIN OR;  Service: Orthopedics       Social History     Socioeconomic History    Marital status:      Spouse name: Not on file    Number of children: Not on file    Years of education: Not on file    Highest education level: Not on file   Occupational History    Not on file   Tobacco Use    Smoking status: Light Smoker    Smokeless tobacco: Current   Vaping Use    Vaping status: Every Day    Start date: 1/1/2021    Substances: Nicotine   Substance and Sexual Activity    Alcohol use: Yes     Alcohol/week: 14.0 standard drinks of alcohol     Types: 14 Cans of beer per week    Drug use: Not Currently    Sexual activity: Not on file   Other Topics Concern    Not on file   Social History Narrative    Not on file     Social Determinants of Health     Financial Resource Strain: Not on file   Food Insecurity: Not on file   Transportation Needs: Not on file   Physical Activity: Not on file   Stress: Not on file   Social Connections: Not on file   Intimate Partner Violence: Not on file    Housing Stability: Not on file       Family History   Problem Relation Age of Onset    No Known Problems Mother     No Known Problems Father         Patient's Medications    No medications on file       No Known Allergies     Ht 6' (1.829 m)   Wt 78.5 kg (173 lb)   BMI 23.46 kg/m²      REVIEW OF SYSTEMS:  Constitutional: Negative.    HEENT: Negative.    Respiratory: Negative.    Skin: Negative.    Neurological: Negative.    Psychiatric/Behavioral: Negative.  Musculoskeletal: Negative except for that mentioned in the HPI.    Ht 6' (1.829 m)   Wt 78.5 kg (173 lb)   BMI 23.46 kg/m²   Gen: No acute distress, resting comfortably in bed  HEENT: Eyes clear, moist mucus membranes, hearing intact  Respiratory: No audible wheezing or stridor  Cardiovascular: Well Perfused peripherally, 2+ distal pulse  Abdomen: nondistended, no peritoneal signs     PHYSICAL EXAM:    Right wrist:  Incision is well-healed  No tenderness over the distal radius  Slightly decreased wrist range of motion secondary stiffness  Sensation intact to the median, radial, ulnar nerves  Brisk cap refill    IMAGING: X-ray of the right wrist taken the office today.  This was reviewed in PACS and demonstrates stable positioning of hardware in the distal radius with interval healing at the fracture site.    ASSESSMENT AND PLAN:  33 y.o. male 6 weeks status post right distal radius ORIF.  He will wean out of the cock up wrist brace.  He may continue therapy until discontinued by the therapist, he may strengthen and do scar massage.  He will progress with activity as tolerated. We will see him back as needed.      Scribe Attestation      I,:  Deon Keenan PA-C am acting as a scribe while in the presence of the attending physician.:       I,:  Santos Talavera personally performed the services described in this documentation    as scribed in my presence.:

## 2024-03-06 ENCOUNTER — OFFICE VISIT (OUTPATIENT)
Dept: OCCUPATIONAL THERAPY | Age: 34
End: 2024-03-06
Payer: COMMERCIAL

## 2024-03-06 DIAGNOSIS — S62.101A CLOSED FRACTURE OF RIGHT WRIST, INITIAL ENCOUNTER: Primary | ICD-10-CM

## 2024-03-06 PROCEDURE — 97110 THERAPEUTIC EXERCISES: CPT | Performed by: OCCUPATIONAL THERAPIST

## 2024-03-06 PROCEDURE — 97530 THERAPEUTIC ACTIVITIES: CPT | Performed by: OCCUPATIONAL THERAPIST

## 2024-03-06 NOTE — PROGRESS NOTES
"Daily Note     Today's date: 3/6/2024  Patient name: Candido Conn  : 1990  MRN: [id number]  Referring provider: Santos Talavera DO  Dx:   Encounter Diagnosis     ICD-10-CM    1. Closed fracture of right wrist, initial encounter  S62.101A                      Subjective: \"I'm tired\"      Objective: See treatment diary below      Assessment: Fatigued but pain free, making good progress  Plan: Continue per plan of care.      Precautions:  ORIF- 3/4 cleared for PROM, Strengthening, brace DC  HEP: FELECIA, Wrist AROM    Manuals 2/14 2/19 2/21 2/26 2/28 3/6       IASTM    10 10        FELECIA             MEM             KT Ulnar nerve            Neuro Re-Ed             Wrist maze 10x 10x 10x 10x 10x        Wall walking tbx10 Tb x 10 Tb x 10 tbx10 10x        translation 2x/time pennies 4x/time pennies 4x/time pennies KP5x KPxk5        Ball bounce   2m x 1 2m 2m                                               Ther Ex             Gentle AAROM  10 10 10  PROM 10       Place + hold/Stepping             AROM Wrist/forearm Wrist/FA Wrist and FA Wrist/FA Wrist/FA        Sup/pro     Dowel 3x 30s Dowel 3x 30s Dowel 3x 30 Hammer 4x 30s       WF/WE/S/P      RPB 3x 10                                              Ther Activity             pinching  Pinch [place] TT/Key YY Pinch [place] TT/Key YY Pinch [place] TT/Key YY Pinch [place] TT/Key RR Pinch [place] TT/Key GG x2       gripping   Pow, Cyl YPW 3x 10 Pow, Cyl YPW 3x 10 Pow, Cyl RPW 3x 10 Pow, Cyl BPW 3x 10; pinch +  BCP/G5       Gait Training                                       Modalities             P 5 5  5 5                                      "

## 2024-03-13 ENCOUNTER — OFFICE VISIT (OUTPATIENT)
Dept: OCCUPATIONAL THERAPY | Age: 34
End: 2024-03-13
Payer: COMMERCIAL

## 2024-03-13 DIAGNOSIS — Z98.890 STATUS POST SURGERY: ICD-10-CM

## 2024-03-13 DIAGNOSIS — S62.101A CLOSED FRACTURE OF RIGHT WRIST, INITIAL ENCOUNTER: Primary | ICD-10-CM

## 2024-03-13 PROCEDURE — 97110 THERAPEUTIC EXERCISES: CPT | Performed by: OCCUPATIONAL THERAPIST

## 2024-03-13 PROCEDURE — 97140 MANUAL THERAPY 1/> REGIONS: CPT | Performed by: OCCUPATIONAL THERAPIST

## 2024-03-13 PROCEDURE — 97530 THERAPEUTIC ACTIVITIES: CPT | Performed by: OCCUPATIONAL THERAPIST

## 2024-03-13 NOTE — PROGRESS NOTES
"Daily Note     Today's date: 3/13/2024  Patient name: Candido Conn  : 1990  MRN: [id number]  Referring provider: Santos Talavera DO  Dx:   Encounter Diagnosis     ICD-10-CM    1. Closed fracture of right wrist, initial encounter  S62.101A       2. Status post surgery ORIF radius/ulna (wrist)  Z98.890                      Subjective: \"It feels good\"      Objective: See treatment diary below      Assessment: Tolerated treatment well, no c/o pain with upgrades      Plan: Continue per plan of care.      Precautions:  ORIF- 3/4 cleared for PROM, Strengthening, brace DC  HEP: FELECIA, Wrist AROM    Manuals 2/14 2/19 2/21 2/26 2/28 3/6 3/13      IASTM    10 10  10      FELECIA             MEM             KT Ulnar nerve            Neuro Re-Ed             Wrist maze 10x 10x 10x 10x 10x        Wall walking tbx10 Tb x 10 Tb x 10 tbx10 10x        translation 2x/time pennies 4x/time pennies 4x/time pennies KP5x KPxk5        Ball bounce   2m x 1 2m 2m                                               Ther Ex             Gentle AAROM  10 10 10  PROM 10 PROM 10      Place + hold/Stepping             AROM Wrist/forearm Wrist/FA Wrist and FA Wrist/FA Wrist/FA        Sup/pro     Dowel 3x 30s Dowel 3x 30s Dowel 3x 30 Hammer 4x 30s Hammer 4x 30s      WF/WE/S/P      RPB 3x 10 GPB 3x 10      Arm bike       6m 5 res      Sled       Push 30' x 10 50#                   Ther Activity             pinching  Pinch [place] TT/Key YY Pinch [place] TT/Key YY Pinch [place] TT/Key YY Pinch [place] TT/Key RR Pinch [place] TT/Key GG x2 Pinch [place] TT/Key BB x2      gripping   Pow, Cyl YPW 3x 10 Pow, Cyl YPW 3x 10 Pow, Cyl RPW 3x 10 Pow, Cyl BPW 3x 10; pinch +  BCP/G5 Pow, Cyl BlaPW 3x 10; pinch +  BCP/Y3      Gait Training                                       Modalities             MHP 5 5  5 5                                        "

## 2024-03-25 ENCOUNTER — APPOINTMENT (OUTPATIENT)
Dept: OCCUPATIONAL THERAPY | Age: 34
End: 2024-03-25
Payer: COMMERCIAL

## 2024-03-27 ENCOUNTER — OFFICE VISIT (OUTPATIENT)
Dept: OCCUPATIONAL THERAPY | Age: 34
End: 2024-03-27
Payer: COMMERCIAL

## 2024-03-27 DIAGNOSIS — Z98.890 STATUS POST SURGERY: ICD-10-CM

## 2024-03-27 DIAGNOSIS — S62.101A CLOSED FRACTURE OF RIGHT WRIST, INITIAL ENCOUNTER: Primary | ICD-10-CM

## 2024-03-27 PROCEDURE — 97110 THERAPEUTIC EXERCISES: CPT | Performed by: OCCUPATIONAL THERAPIST

## 2024-03-27 PROCEDURE — 97140 MANUAL THERAPY 1/> REGIONS: CPT | Performed by: OCCUPATIONAL THERAPIST

## 2024-03-27 NOTE — PROGRESS NOTES
"Daily Note     Today's date: 3/27/2024  Patient name: Candido Conn  : 1990  MRN: [id number]  Referring provider: Santos Talavera DO  Dx:   Encounter Diagnosis     ICD-10-CM    1. Closed fracture of right wrist, initial encounter  S62.101A       2. Status post surgery ORIF radius/ulna (wrist)  Z98.890                        Subjective: \"I still can't do pushups\"      Objective: See treatment diary below      Assessment: Tolerated well, strength and endurance improving but weight bearing still painful      Plan: Continue per plan of care.      Precautions:  ORIF- 3/4 cleared for PROM, Strengthening, brace DC  HEP: FELECIA, Wrist AROM    Manuals 2/14 2/19 2/21 2/26 2/28 3/6 3/13 3/27     IASTM    10 10  10 5     FELECIA             MEM             KT Ulnar nerve            Neuro Re-Ed             Wrist maze 10x 10x 10x 10x 10x        Wall walking tbx10 Tb x 10 Tb x 10 tbx10 10x        translation 2x/time pennies 4x/time pennies 4x/time pennies KP5x KPxk5        Ball bounce   2m x 1 2m 2m                                               Ther Ex             Gentle AAROM  10 10 10  PROM 10 PROM 10 5     Place + hold/Stepping             AROM Wrist/forearm Wrist/FA Wrist and FA Wrist/FA Wrist/FA        Sup/pro     Dowel 3x 30s Dowel 3x 30s Dowel 3x 30 Hammer 4x 30s Hammer 4x 30s      WF/WE/S/P      RPB 3x 10 GPB 3x 10 BPB 3x 10     Arm bike       6m 5 res 6m 7 res     Sled       Push 30' x 10 50# Push 30' x 10 75#     MWM        Weight bearing     Ther Activity             pinching  Pinch [place] TT/Key YY Pinch [place] TT/Key YY Pinch [place] TT/Key YY Pinch [place] TT/Key RR Pinch [place] TT/Key GG x2 Pinch [place] TT/Key BB x2 Pinch [place] TT/Key BB x2     gripping   Pow, Cyl YPW 3x 10 Pow, Cyl YPW 3x 10 Pow, Cyl RPW 3x 10 Pow, Cyl BPW 3x 10; pinch +  BCP/G5 Pow, Cyl BlaPW 3x 10; pinch +  BCP/Y3 Pow, Cyl BlaPW 3x 10; pinch +  BCP/Y3     Gait Training                                       Modalities          "    [facility] 5 5  5 5

## 2024-04-03 ENCOUNTER — OFFICE VISIT (OUTPATIENT)
Dept: OCCUPATIONAL THERAPY | Age: 34
End: 2024-04-03
Payer: COMMERCIAL

## 2024-04-03 DIAGNOSIS — S62.101A CLOSED FRACTURE OF RIGHT WRIST, INITIAL ENCOUNTER: Primary | ICD-10-CM

## 2024-04-03 DIAGNOSIS — Z98.890 STATUS POST SURGERY: ICD-10-CM

## 2024-04-03 PROCEDURE — 97110 THERAPEUTIC EXERCISES: CPT | Performed by: OCCUPATIONAL THERAPIST

## 2024-04-03 PROCEDURE — 97140 MANUAL THERAPY 1/> REGIONS: CPT | Performed by: OCCUPATIONAL THERAPIST

## 2024-04-03 NOTE — PROGRESS NOTES
"Daily Note     Today's date: 4/3/2024  Patient name: Candido Conn  : 1990  MRN: [id number]  Referring provider: Santos Talavera DO  Dx:   Encounter Diagnosis     ICD-10-CM    1. Closed fracture of right wrist, initial encounter  S62.101A       2. Status post surgery ORIF radius/ulna (wrist)  Z98.890                      Subjective: \"It's less stiff going back\"      Objective: See treatment diary below      Assessment: Tolerated well. WE improving    Plan: Progress note during next visit.      Precautions:  ORIF- 3/4 cleared for PROM, Strengthening, brace DC  HEP: FELECIA, Wrist AROM    Manuals 2/14 2/19 2/21 2/26 2/28 3/6 3/13 3/27 4/3    IASTM    10 10  10 5 5    FELECIA             MEM             KT Ulnar nerve            Neuro Re-Ed             Wrist maze 10x 10x 10x 10x 10x        Wall walking tbx10 Tb x 10 Tb x 10 tbx10 10x        translation 2x/time pennies 4x/time pennies 4x/time pennies KP5x KPxk5        Ball bounce   2m x 1 2m 2m                                               Ther Ex             Gentle AAROM  10 10 10  PROM 10 PROM 10 5     Place + hold/Stepping             AROM Wrist/forearm Wrist/FA Wrist and FA Wrist/FA Wrist/FA        Sup/pro     Dowel 3x 30s Dowel 3x 30s Dowel 3x 30 Hammer 4x 30s Hammer 4x 30s      WF/WE/S/P      RPB 3x 10 GPB 3x 10 BPB 3x 10 BPB 3x 10    Arm bike       6m 5 res 6m 7 res 6m 7 res    Sled       Push 30' x 10 50# Push 30' x 10 75# Push 30' x 10 100#    Wall walking         2# x 20    Ther Activity             pinching  Pinch [place] TT/Key YY Pinch [place] TT/Key YY Pinch [place] TT/Key YY Pinch [place] TT/Key RR Pinch [place] TT/Key GG x2 Pinch [place] TT/Key BB x2 Pinch [place] TT/Key BB x2     gripping   Pow, Cyl YPW 3x 10 Pow, Cyl YPW 3x 10 Pow, Cyl RPW 3x 10 Pow, Cyl BPW 3x 10; pinch +  BCP/G5 Pow, Cyl BlaPW 3x 10; pinch +  BCP/Y3 Pow, Cyl BlaPW 3x 10; pinch +  BCP/Y3 Pow, Cyl BlaPW 3x 10; pinch +  BCP/Y4    Gait Training                           "             Modalities             [facility] 5 5  5 5

## 2024-04-08 ENCOUNTER — OFFICE VISIT (OUTPATIENT)
Dept: OCCUPATIONAL THERAPY | Age: 34
End: 2024-04-08
Payer: COMMERCIAL

## 2024-04-08 DIAGNOSIS — S62.101A CLOSED FRACTURE OF RIGHT WRIST, INITIAL ENCOUNTER: Primary | ICD-10-CM

## 2024-04-08 DIAGNOSIS — Z98.890 STATUS POST SURGERY: ICD-10-CM

## 2024-04-08 PROCEDURE — 97110 THERAPEUTIC EXERCISES: CPT | Performed by: OCCUPATIONAL THERAPIST

## 2024-04-08 PROCEDURE — 97140 MANUAL THERAPY 1/> REGIONS: CPT | Performed by: OCCUPATIONAL THERAPIST

## 2024-04-08 NOTE — PROGRESS NOTES
"Daily Note     Today's date: 2024  Patient name: Candido Conn  : 1990  MRN: [id number]  Referring provider: Santos Talavera DO  Dx:   Encounter Diagnosis     ICD-10-CM    1. Closed fracture of right wrist, initial encounter  S62.101A       2. Status post surgery ORIF radius/ulna (wrist)  Z98.890                      Subjective: \"It feels like the tendon gets stuck\"      Objective: See treatment diary below      Assessment: Symptomatic APL tendon with WE/Weight bearing, focused IASTM on  dorsal      Plan: Continue per plan of care.      Precautions:  ORIF- 3/4 cleared for PROM, Strengthening, brace DC  HEP: FELECIA, Wrist AROM    Manuals 2/14 2/19 2/21 2/26 2/28 3/6 3/13 3/27 4/3 4/8   IASTM    10 10  10 5 5 25   FELECIA             MEM             KT Ulnar nerve            Neuro Re-Ed             Wrist maze 10x 10x 10x 10x 10x        Wall walking tbx10 Tb x 10 Tb x 10 tbx10 10x        translation 2x/time pennies 4x/time pennies 4x/time pennies KP5x KPxk5        Ball bounce   2m x 1 2m 2m                                               Ther Ex             Gentle AAROM  10 10 10  PROM 10 PROM 10 5     Place + hold/Stepping             AROM Wrist/forearm Wrist/FA Wrist and FA Wrist/FA Wrist/FA        Sup/pro     Dowel 3x 30s Dowel 3x 30s Dowel 3x 30 Hammer 4x 30s Hammer 4x 30s      WF/WE/S/P      RPB 3x 10 GPB 3x 10 BPB 3x 10 BPB 3x 10 BPB 3x 10   Arm bike       6m 5 res 6m 7 res 6m 7 res 6m 7 res   Sled       Push 30' x 10 50# Push 30' x 10 75# Push 30' x 10 100# Push 30' x 10 100#   Wall walking         2# x 20 2# x 20   Ther Activity             pinching  Pinch [place] TT/Key YY Pinch [place] TT/Key YY Pinch [place] TT/Key YY Pinch [place] TT/Key RR Pinch [place] TT/Key GG x2 Pinch [place] TT/Key BB x2 Pinch [place] TT/Key BB x2     gripping   Pow, Cyl YPW 3x 10 Pow, Cyl YPW 3x 10 Pow, Cyl RPW 3x 10 Pow, Cyl BPW 3x 10; pinch +  BCP/G5 Pow, Cyl BlaPW 3x 10; pinch +  BCP/Y3 Pow, Cyl BlaPW 3x 10; pinch " +  BCP/Y3 Pow, Cyl BlaPW 3x 10; pinch +  BCP/Y4    Gait Training                                       Modalities             [facility] 5 5  5 5

## 2024-04-10 ENCOUNTER — APPOINTMENT (OUTPATIENT)
Dept: OCCUPATIONAL THERAPY | Age: 34
End: 2024-04-10
Payer: COMMERCIAL

## (undated) DEVICE — SUT ETHILON 3-0 FSL 30 IN 1671H

## (undated) DEVICE — CUFF TOURNIQUET 18 X 4 IN QUICK CONNECT DISP 1 BLADDER

## (undated) DEVICE — DRILL BIT 2.5MM CALIBRATED

## (undated) DEVICE — 3M™ STERI-STRIP™ REINFORCED ADHESIVE SKIN CLOSURES, R1547, 1/2 IN X 4 IN (12 MM X 100 MM), 6 STRIPS/ENVELOPE: Brand: 3M™ STERI-STRIP™

## (undated) DEVICE — DRILL BIT 1.7MM

## (undated) DEVICE — DRAPE C-ARM X-RAY

## (undated) DEVICE — K-WIRE 1.3 X 130MM TROCAR TIP
Type: IMPLANTABLE DEVICE | Site: WRIST | Status: NON-FUNCTIONAL
Removed: 2024-01-23

## (undated) DEVICE — SUT MONOCRYL 2-0 SH 27 IN Y417H

## (undated) DEVICE — ACE WRAP 4 IN UNSTERILE

## (undated) DEVICE — BANDAGE, ESMARK LF STR 4"X9'(20/CS): Brand: CYPRESS

## (undated) DEVICE — COBAN 4 IN STERILE

## (undated) DEVICE — GLOVE INDICATOR PI UNDERGLOVE SZ 8 BLUE

## (undated) DEVICE — U-DRAPE: Brand: CONVERTORS

## (undated) DEVICE — 10FR FRAZIER SUCTION HANDLE: Brand: CARDINAL HEALTH

## (undated) DEVICE — MAYO STAND COVER: Brand: CONVERTORS

## (undated) DEVICE — DRESSING MEPILEX AG BORDER POST-OP 4 X 6 IN

## (undated) DEVICE — ARM SLING: Brand: DEROYAL

## (undated) DEVICE — SUT MONOCRYL 3-0 PS-2 18 IN Y497G

## (undated) DEVICE — GLOVE SRG BIOGEL 7.5

## (undated) DEVICE — STERILE BETHLEHEM PLASTIC HAND: Brand: CARDINAL HEALTH

## (undated) DEVICE — PADDING CAST 4 IN  COTTON STRL

## (undated) DEVICE — INTENDED FOR TISSUE SEPARATION, AND OTHER PROCEDURES THAT REQUIRE A SHARP SURGICAL BLADE TO PUNCTURE OR CUT.: Brand: BARD-PARKER ® CARBON RIB-BACK BLADES

## (undated) DEVICE — K-WIRE 0.45MM ROUND END SMTH TROCAR PNT BB-TAK
Type: IMPLANTABLE DEVICE | Site: WRIST | Status: NON-FUNCTIONAL
Removed: 2024-01-23

## (undated) DEVICE — ADHESIVE SKIN HIGH VISCOSITY EXOFIN 1ML

## (undated) DEVICE — SUT VICRYL 2-0 SH 27 IN UNDYED J417H

## (undated) DEVICE — TUBING SUCTION 5MM X 12 FT

## (undated) DEVICE — ACE WRAP 3 IN STERILE

## (undated) DEVICE — GLOVE SRG BIOGEL 8